# Patient Record
Sex: MALE | Race: WHITE | Employment: FULL TIME | URBAN - METROPOLITAN AREA
[De-identification: names, ages, dates, MRNs, and addresses within clinical notes are randomized per-mention and may not be internally consistent; named-entity substitution may affect disease eponyms.]

---

## 2017-04-11 ENCOUNTER — TRANSFERRED RECORDS (OUTPATIENT)
Dept: HEALTH INFORMATION MANAGEMENT | Facility: CLINIC | Age: 62
End: 2017-04-11

## 2017-04-11 ENCOUNTER — APPOINTMENT (OUTPATIENT)
Dept: CT IMAGING | Facility: CLINIC | Age: 62
DRG: 446 | End: 2017-04-11
Attending: EMERGENCY MEDICINE
Payer: COMMERCIAL

## 2017-04-11 ENCOUNTER — HOSPITAL ENCOUNTER (INPATIENT)
Facility: CLINIC | Age: 62
LOS: 2 days | Discharge: HOME | DRG: 446 | End: 2017-04-13
Attending: EMERGENCY MEDICINE | Admitting: INTERNAL MEDICINE
Payer: COMMERCIAL

## 2017-04-11 ENCOUNTER — APPOINTMENT (OUTPATIENT)
Dept: ULTRASOUND IMAGING | Facility: CLINIC | Age: 62
DRG: 446 | End: 2017-04-11
Attending: EMERGENCY MEDICINE
Payer: COMMERCIAL

## 2017-04-11 DIAGNOSIS — R17 JAUNDICE: ICD-10-CM

## 2017-04-11 DIAGNOSIS — E80.6 DIRECT HYPERBILIRUBINEMIA: ICD-10-CM

## 2017-04-11 LAB
ALBUMIN SERPL-MCNC: 3.5 G/DL (ref 3.4–5)
ALBUMIN UR-MCNC: NEGATIVE MG/DL
ALP SERPL-CCNC: 103 U/L (ref 40–150)
ALT SERPL W P-5'-P-CCNC: 235 U/L (ref 0–70)
ANION GAP SERPL CALCULATED.3IONS-SCNC: 10 MMOL/L (ref 3–14)
APPEARANCE UR: CLEAR
AST SERPL W P-5'-P-CCNC: 119 U/L (ref 0–45)
BASOPHILS # BLD AUTO: 0 10E9/L (ref 0–0.2)
BASOPHILS NFR BLD AUTO: 0.4 %
BILIRUB DIRECT SERPL-MCNC: 12.3 MG/DL (ref 0–0.2)
BILIRUB SERPL-MCNC: 20.4 MG/DL (ref 0.2–1.3)
BILIRUB UR QL STRIP: ABNORMAL
BUN SERPL-MCNC: 12 MG/DL (ref 7–30)
CALCIUM SERPL-MCNC: 8.5 MG/DL (ref 8.5–10.1)
CHLORIDE SERPL-SCNC: 103 MMOL/L (ref 94–109)
CO2 SERPL-SCNC: 25 MMOL/L (ref 20–32)
COLOR UR AUTO: ABNORMAL
CREAT SERPL-MCNC: 0.86 MG/DL (ref 0.66–1.25)
DIFFERENTIAL METHOD BLD: ABNORMAL
EOSINOPHIL # BLD AUTO: 0.1 10E9/L (ref 0–0.7)
EOSINOPHIL NFR BLD AUTO: 1.6 %
ERYTHROCYTE [DISTWIDTH] IN BLOOD BY AUTOMATED COUNT: 13.3 % (ref 10–15)
ETHANOL SERPL-MCNC: <0.01 G/DL
GFR SERPL CREATININE-BSD FRML MDRD: ABNORMAL ML/MIN/1.7M2
GLUCOSE SERPL-MCNC: 77 MG/DL (ref 70–99)
GLUCOSE UR STRIP-MCNC: NEGATIVE MG/DL
HCT VFR BLD AUTO: 35.8 % (ref 40–53)
HGB BLD-MCNC: 12.4 G/DL (ref 13.3–17.7)
HGB UR QL STRIP: NEGATIVE
IMM GRANULOCYTES # BLD: 0 10E9/L (ref 0–0.4)
IMM GRANULOCYTES NFR BLD: 0.7 %
INR PPP: 1.15 (ref 0.86–1.14)
KETONES UR STRIP-MCNC: 20 MG/DL
LEUKOCYTE ESTERASE UR QL STRIP: NEGATIVE
LIPASE SERPL-CCNC: 88 U/L (ref 73–393)
LYMPHOCYTES # BLD AUTO: 0.9 10E9/L (ref 0.8–5.3)
LYMPHOCYTES NFR BLD AUTO: 20.3 %
MCH RBC QN AUTO: 32.9 PG (ref 26.5–33)
MCHC RBC AUTO-ENTMCNC: 34.6 G/DL (ref 31.5–36.5)
MCV RBC AUTO: 95 FL (ref 78–100)
MONOCYTES # BLD AUTO: 0.4 10E9/L (ref 0–1.3)
MONOCYTES NFR BLD AUTO: 8 %
MUCOUS THREADS #/AREA URNS LPF: PRESENT /LPF
NEUTROPHILS # BLD AUTO: 3.1 10E9/L (ref 1.6–8.3)
NEUTROPHILS NFR BLD AUTO: 69 %
NITRATE UR QL: NEGATIVE
NRBC # BLD AUTO: 0 10*3/UL
NRBC BLD AUTO-RTO: 0 /100
PH UR STRIP: 5 PH (ref 5–7)
PLATELET # BLD AUTO: 94 10E9/L (ref 150–450)
POTASSIUM SERPL-SCNC: 3.4 MMOL/L (ref 3.4–5.3)
PROT SERPL-MCNC: 6.8 G/DL (ref 6.8–8.8)
RBC # BLD AUTO: 3.77 10E12/L (ref 4.4–5.9)
RBC #/AREA URNS AUTO: <1 /HPF (ref 0–2)
SODIUM SERPL-SCNC: 138 MMOL/L (ref 133–144)
SP GR UR STRIP: 1.01 (ref 1–1.03)
TROPONIN I SERPL-MCNC: NORMAL UG/L (ref 0–0.04)
URN SPEC COLLECT METH UR: ABNORMAL
UROBILINOGEN UR STRIP-MCNC: 4 MG/DL (ref 0–2)
WBC # BLD AUTO: 4.5 10E9/L (ref 4–11)
WBC #/AREA URNS AUTO: 1 /HPF (ref 0–2)

## 2017-04-11 PROCEDURE — 80053 COMPREHEN METABOLIC PANEL: CPT | Performed by: EMERGENCY MEDICINE

## 2017-04-11 PROCEDURE — 84484 ASSAY OF TROPONIN QUANT: CPT | Performed by: EMERGENCY MEDICINE

## 2017-04-11 PROCEDURE — 86708 HEPATITIS A ANTIBODY: CPT | Performed by: EMERGENCY MEDICINE

## 2017-04-11 PROCEDURE — 76700 US EXAM ABDOM COMPLETE: CPT

## 2017-04-11 PROCEDURE — 25500064 ZZH RX 255 OP 636: Performed by: EMERGENCY MEDICINE

## 2017-04-11 PROCEDURE — 74177 CT ABD & PELVIS W/CONTRAST: CPT

## 2017-04-11 PROCEDURE — 99285 EMERGENCY DEPT VISIT HI MDM: CPT | Mod: 25

## 2017-04-11 PROCEDURE — 82248 BILIRUBIN DIRECT: CPT | Performed by: EMERGENCY MEDICINE

## 2017-04-11 PROCEDURE — 36415 COLL VENOUS BLD VENIPUNCTURE: CPT | Performed by: EMERGENCY MEDICINE

## 2017-04-11 PROCEDURE — 86803 HEPATITIS C AB TEST: CPT | Performed by: EMERGENCY MEDICINE

## 2017-04-11 PROCEDURE — 86706 HEP B SURFACE ANTIBODY: CPT | Performed by: EMERGENCY MEDICINE

## 2017-04-11 PROCEDURE — 85610 PROTHROMBIN TIME: CPT | Performed by: EMERGENCY MEDICINE

## 2017-04-11 PROCEDURE — 12000000 ZZH R&B MED SURG/OB

## 2017-04-11 PROCEDURE — 80320 DRUG SCREEN QUANTALCOHOLS: CPT | Performed by: EMERGENCY MEDICINE

## 2017-04-11 PROCEDURE — 81001 URINALYSIS AUTO W/SCOPE: CPT | Performed by: EMERGENCY MEDICINE

## 2017-04-11 PROCEDURE — 96360 HYDRATION IV INFUSION INIT: CPT

## 2017-04-11 PROCEDURE — 85025 COMPLETE CBC W/AUTO DIFF WBC: CPT | Performed by: EMERGENCY MEDICINE

## 2017-04-11 PROCEDURE — 87340 HEPATITIS B SURFACE AG IA: CPT | Performed by: EMERGENCY MEDICINE

## 2017-04-11 PROCEDURE — 99223 1ST HOSP IP/OBS HIGH 75: CPT | Mod: AI | Performed by: INTERNAL MEDICINE

## 2017-04-11 PROCEDURE — 25000128 H RX IP 250 OP 636: Performed by: EMERGENCY MEDICINE

## 2017-04-11 PROCEDURE — 83690 ASSAY OF LIPASE: CPT | Performed by: EMERGENCY MEDICINE

## 2017-04-11 PROCEDURE — 96361 HYDRATE IV INFUSION ADD-ON: CPT

## 2017-04-11 RX ORDER — ONDANSETRON 2 MG/ML
4 INJECTION INTRAMUSCULAR; INTRAVENOUS EVERY 6 HOURS PRN
Status: DISCONTINUED | OUTPATIENT
Start: 2017-04-11 | End: 2017-04-13 | Stop reason: HOSPADM

## 2017-04-11 RX ORDER — ONDANSETRON 4 MG/1
4 TABLET, ORALLY DISINTEGRATING ORAL EVERY 6 HOURS PRN
Status: DISCONTINUED | OUTPATIENT
Start: 2017-04-11 | End: 2017-04-13 | Stop reason: HOSPADM

## 2017-04-11 RX ORDER — DIPHENHYDRAMINE HCL 25 MG
25 CAPSULE ORAL EVERY 6 HOURS PRN
COMMUNITY

## 2017-04-11 RX ORDER — HYDROMORPHONE HYDROCHLORIDE 1 MG/ML
.3-.5 INJECTION, SOLUTION INTRAMUSCULAR; INTRAVENOUS; SUBCUTANEOUS
Status: DISCONTINUED | OUTPATIENT
Start: 2017-04-11 | End: 2017-04-13 | Stop reason: HOSPADM

## 2017-04-11 RX ORDER — NALOXONE HYDROCHLORIDE 0.4 MG/ML
.1-.4 INJECTION, SOLUTION INTRAMUSCULAR; INTRAVENOUS; SUBCUTANEOUS
Status: DISCONTINUED | OUTPATIENT
Start: 2017-04-11 | End: 2017-04-13 | Stop reason: HOSPADM

## 2017-04-11 RX ORDER — IOPAMIDOL 755 MG/ML
500 INJECTION, SOLUTION INTRAVASCULAR ONCE
Status: COMPLETED | OUTPATIENT
Start: 2017-04-11 | End: 2017-04-11

## 2017-04-11 RX ADMIN — SODIUM CHLORIDE 1000 ML: 9 INJECTION, SOLUTION INTRAVENOUS at 20:13

## 2017-04-11 RX ADMIN — SODIUM CHLORIDE 65 ML: 9 INJECTION, SOLUTION INTRAVENOUS at 23:21

## 2017-04-11 RX ADMIN — IOPAMIDOL 100 ML: 755 INJECTION, SOLUTION INTRAVENOUS at 23:21

## 2017-04-11 ASSESSMENT — ACTIVITIES OF DAILY LIVING (ADL)
AMBULATION: 0-->INDEPENDENT
COGNITION: 0 - NO COGNITION ISSUES REPORTED
RETIRED_EATING: 0-->INDEPENDENT
SWALLOWING: 0-->SWALLOWS FOODS/LIQUIDS WITHOUT DIFFICULTY
TOILETING: 0-->INDEPENDENT
RETIRED_COMMUNICATION: 0-->UNDERSTANDS/COMMUNICATES WITHOUT DIFFICULTY
SWALLOWING: 0-->SWALLOWS FOODS/LIQUIDS WITHOUT DIFFICULTY
DRESS: 0-->INDEPENDENT
TOILETING: 0-->INDEPENDENT
COMMUNICATION: 0-->UNDERSTANDS/COMMUNICATES WITHOUT DIFFICULTY
TRANSFERRING: 0-->INDEPENDENT
AMBULATION: 0-->INDEPENDENT
BATHING: 0-->INDEPENDENT
BATHING: 0-->INDEPENDENT
TRANSFERRING: 0-->INDEPENDENT
FALL_HISTORY_WITHIN_LAST_SIX_MONTHS: NO
DRESS: 0-->INDEPENDENT
EATING: 0-->INDEPENDENT

## 2017-04-11 ASSESSMENT — ENCOUNTER SYMPTOMS
DYSURIA: 0
COLOR CHANGE: 1
ABDOMINAL PAIN: 1

## 2017-04-11 NOTE — IP AVS SNAPSHOT
MRN:[id number]                      After Visit Summary   4/11/2017    Phillip Crabtree    MRN: [id number]           Thank you!     Thank you for choosing [facility] for your care. Our goal is always to provide you with excellent care. Hearing back from our patients is one way we can continue to improve our services. Please take a few minutes to complete the written survey that you may receive in the mail after you visit. If you would like to speak to someone directly about your visit please contact Patient Relations at [phone]. Thank you!          Patient Information     Date Of Birth          1955        Designated Caregiver       Most Recent Value    Caregiver    Will someone help with your care after discharge? yes    Name of designated caregiver Xi     Phone number of caregiver [phone]    Caregiver address [address]      About your hospital stay     You were admitted on:  April 11, 2017 You last received care in the:  [place] [facility]    You were discharged on:  April 13, 2017        Reason for your hospital stay       jaundice                  Who to Call     For medical emergencies, please call 911.  For non-urgent questions about your medical care, please call your primary care provider or clinic, [phone]          Attending Provider     Provider Specialty    Apryl Rivera MD Emergency Medicine    Chelsea Crowe MD Internal Medicine       Primary Care Provider Office Phone # Fax #    [facility] [phone] [address] [facility] [address]        After Care Instructions     Activity       Your activity upon discharge: activity as tolerated            Diet       Follow this diet upon discharge: Orders Placed This Encounter      Low Fat Diet                  Follow-up Appointments     Follow-up and recommended labs and tests        Follow up with  "primary care provider, [facility], within 7 days   Follow up with liver clinic as scheduled                  Pending Results     No orders found from 2017 to 2017.            Statement of Approval     Ordered          17 [id number]  I have reviewed and agree with all the recommendations and orders detailed in this document.  EFFECTIVE NOW     Approved and electronically signed by:  Luis Valerio MD             Admission Information     Date & Time Provider Department Dept. Phone    2017 Chelsea Crowe MD [place] [address] [facility] [phone]      Your Vitals Were     Blood Pressure Pulse Temperature Respirations Height Weight    138/78 (BP Location: Left arm) 62 97.6  F (36.4  C) (Oral) 18 1.778 m (5' 10\") 106.8 kg (235 lb 8 oz)    Pulse Oximetry BMI (Body Mass Index)                99% 33.79 kg/m2          MyChart Information     [facility] lets you send messages to your doctor, view your test results, renew your prescriptions, schedule appointments and more. To sign up, go to [email or web address] . Click on \"Log in\" on the left side of the screen, which will take you to the Welcome page. Then click on \"Sign up Now\" on the right side of the page.     You will be asked to enter the access code listed below, as well as some personal information. Please follow the directions to create your username and password.     Your access code is: [email or web address]  Expires: 2017  4:58 PM     Your access code will  in 90 days. If you need help or a new code, please call your [facility] clinic or [phone].        Care EveryWhere ID     This is your Care EveryWhere ID. This could be used by other organizations to access your [facility] medical records  [id number]           Review of your medicines      CONTINUE these medicines which have NOT CHANGED        Dose / Directions    BENADRYL 25 MG capsule   Generic drug:  diphenhydrAMINE        Dose:  25 mg   Take 25 " mg by mouth every 6 hours as needed for itching or allergies   Refills:  0       OMEPRAZOLE PO        Dose:  20 mg   Take 20 mg by mouth every morning   Refills:  0       VITAMIN D3 PO        Dose:  2000 Units   Take 2,000 Units by mouth daily   Refills:  0                Protect others around you: Learn how to safely use, store and throw away your medicines at www.disposemymeds.org.             Medication List: This is a list of all your medications and when to take them. Check marks below indicate your daily home schedule. Keep this list as a reference.      Medications           Morning Afternoon Evening Bedtime As Needed    BENADRYL 25 MG capsule   Take 25 mg by mouth every 6 hours as needed for itching or allergies   Generic drug:  diphenhydrAMINE                                OMEPRAZOLE PO   Take 20 mg by mouth every morning                                VITAMIN D3 PO   Take 2,000 Units by mouth daily

## 2017-04-11 NOTE — IP AVS SNAPSHOT
[facility] [facility] [address] [facility]    [address]    [facility] [address]    Phone:  [phone]    Fax:  [phone]                                       After Visit Summary   4/11/2017    Phillip Crabtree    MRN: [id number]           After Visit Summary Signature Page     I have received my discharge instructions, and my questions have been answered. I have discussed any challenges I see with this plan with the nurse or doctor.    ..........................................................................................................................................  Patient/Patient Representative Signature      ..........................................................................................................................................  Patient Representative Print Name and Relationship to Patient    ..................................................               ................................................  Date                                            Time    ..........................................................................................................................................  Reviewed by Signature/Title    ...................................................              ..............................................  Date                                                            Time

## 2017-04-12 LAB
ALBUMIN SERPL-MCNC: 3.1 G/DL (ref 3.4–5)
ALP SERPL-CCNC: 88 U/L (ref 40–150)
ALT SERPL W P-5'-P-CCNC: 187 U/L (ref 0–70)
ANION GAP SERPL CALCULATED.3IONS-SCNC: 8 MMOL/L (ref 3–14)
AST SERPL W P-5'-P-CCNC: 85 U/L (ref 0–45)
BILIRUB SERPL-MCNC: 16.2 MG/DL (ref 0.2–1.3)
BUN SERPL-MCNC: 10 MG/DL (ref 7–30)
CALCIUM SERPL-MCNC: 8.2 MG/DL (ref 8.5–10.1)
CHLORIDE SERPL-SCNC: 108 MMOL/L (ref 94–109)
CO2 SERPL-SCNC: 24 MMOL/L (ref 20–32)
CREAT SERPL-MCNC: 0.82 MG/DL (ref 0.66–1.25)
ERYTHROCYTE [DISTWIDTH] IN BLOOD BY AUTOMATED COUNT: 13.4 % (ref 10–15)
GFR SERPL CREATININE-BSD FRML MDRD: ABNORMAL ML/MIN/1.7M2
GLUCOSE SERPL-MCNC: 98 MG/DL (ref 70–99)
HAV IGG SER QL IA: NORMAL
HAV IGM SERPL QL IA: NORMAL
HBV SURFACE AB SERPL IA-ACNC: 182.35 M[IU]/ML
HBV SURFACE AG SERPL QL IA: NONREACTIVE
HCT VFR BLD AUTO: 32.7 % (ref 40–53)
HCV AB SERPL QL IA: NORMAL
HETEROPH AB SER QL: NEGATIVE
HGB BLD-MCNC: 11.5 G/DL (ref 13.3–17.7)
IRON SATN MFR SERPL: 20 % (ref 15–46)
IRON SERPL-MCNC: 51 UG/DL (ref 35–180)
MCH RBC QN AUTO: 33.3 PG (ref 26.5–33)
MCHC RBC AUTO-ENTMCNC: 35.2 G/DL (ref 31.5–36.5)
MCV RBC AUTO: 95 FL (ref 78–100)
PLATELET # BLD AUTO: 93 10E9/L (ref 150–450)
POTASSIUM SERPL-SCNC: 3.5 MMOL/L (ref 3.4–5.3)
PROT SERPL-MCNC: 6 G/DL (ref 6.8–8.8)
RBC # BLD AUTO: 3.45 10E12/L (ref 4.4–5.9)
SODIUM SERPL-SCNC: 140 MMOL/L (ref 133–144)
TIBC SERPL-MCNC: 259 UG/DL (ref 240–430)
WBC # BLD AUTO: 3.3 10E9/L (ref 4–11)

## 2017-04-12 PROCEDURE — 12000000 ZZH R&B MED SURG/OB

## 2017-04-12 PROCEDURE — 86038 ANTINUCLEAR ANTIBODIES: CPT | Performed by: PHYSICIAN ASSISTANT

## 2017-04-12 PROCEDURE — 25000125 ZZHC RX 250: Performed by: INTERNAL MEDICINE

## 2017-04-12 PROCEDURE — 83550 IRON BINDING TEST: CPT | Performed by: PHYSICIAN ASSISTANT

## 2017-04-12 PROCEDURE — 99232 SBSQ HOSP IP/OBS MODERATE 35: CPT | Performed by: INTERNAL MEDICINE

## 2017-04-12 PROCEDURE — 86308 HETEROPHILE ANTIBODY SCREEN: CPT | Performed by: PHYSICIAN ASSISTANT

## 2017-04-12 PROCEDURE — 83516 IMMUNOASSAY NONANTIBODY: CPT | Performed by: PHYSICIAN ASSISTANT

## 2017-04-12 PROCEDURE — 85027 COMPLETE CBC AUTOMATED: CPT | Performed by: INTERNAL MEDICINE

## 2017-04-12 PROCEDURE — 36415 COLL VENOUS BLD VENIPUNCTURE: CPT | Performed by: INTERNAL MEDICINE

## 2017-04-12 PROCEDURE — 86709 HEPATITIS A IGM ANTIBODY: CPT | Performed by: PHYSICIAN ASSISTANT

## 2017-04-12 PROCEDURE — 80053 COMPREHEN METABOLIC PANEL: CPT | Performed by: INTERNAL MEDICINE

## 2017-04-12 PROCEDURE — 36415 COLL VENOUS BLD VENIPUNCTURE: CPT | Performed by: PHYSICIAN ASSISTANT

## 2017-04-12 PROCEDURE — 86645 CMV ANTIBODY IGM: CPT | Performed by: PHYSICIAN ASSISTANT

## 2017-04-12 PROCEDURE — 83540 ASSAY OF IRON: CPT | Performed by: PHYSICIAN ASSISTANT

## 2017-04-12 RX ORDER — DIPHENHYDRAMINE HCL 25 MG
25 CAPSULE ORAL EVERY 6 HOURS PRN
Status: DISCONTINUED | OUTPATIENT
Start: 2017-04-12 | End: 2017-04-13 | Stop reason: HOSPADM

## 2017-04-12 RX ADMIN — DEXTROSE AND SODIUM CHLORIDE: 5; 900 INJECTION, SOLUTION INTRAVENOUS at 06:43

## 2017-04-12 RX ADMIN — DEXTROSE AND SODIUM CHLORIDE: 5; 900 INJECTION, SOLUTION INTRAVENOUS at 14:44

## 2017-04-12 RX ADMIN — DEXTROSE AND SODIUM CHLORIDE: 5; 900 INJECTION, SOLUTION INTRAVENOUS at 00:02

## 2017-04-12 RX ADMIN — PANTOPRAZOLE SODIUM 40 MG: 40 INJECTION, POWDER, FOR SOLUTION INTRAVENOUS at 06:43

## 2017-04-12 RX ADMIN — DEXTROSE AND SODIUM CHLORIDE: 5; 900 INJECTION, SOLUTION INTRAVENOUS at 23:19

## 2017-04-12 NOTE — PHARMACY-ADMISSION MEDICATION HISTORY
Admission medication history interview status for this patient is complete. See [facility] admission navigator for allergy information, prior to admission medications and immunization status.     Medication history interview source(s):Patient  Medication history resources (including written lists, pill bottles, clinic record):None  Primary pharmacy:-    Changes made to PTA medication list:  Added: benadryl  Deleted: -  Changed: -    Actions taken by pharmacist (provider contacted, etc):None     Additional medication history information:None    Medication reconciliation/reorder completed by provider prior to medication history? No    For patients on insulin therapy: No  Lantus/levemir/NPH/Mix 70/30 dose:  _____   in AM/PM  or twice daily   Sliding scale Novolog Y/N  If Yes, do you have a baseline novolog pre-meal dose:  ______units with meals   Patients eat three meals a day:   Y/N     Any Barriers to therapy:  cost of medications/comfortable with giving injections (if applicable)/ comfortable and confident with current diabetes regimen       Prior to Admission medications    Medication Sig Last Dose Taking? Auth Provider   diphenhydrAMINE (BENADRYL) 25 MG capsule Take 25 mg by mouth every 6 hours as needed for itching or allergies  Yes Unknown, Entered By History   OMEPRAZOLE PO Take 20 mg by mouth every morning 4/11/2017 at Unknown time Yes Unknown, Entered By History   Cholecalciferol (VITAMIN D3 PO) Take 2,000 Units by mouth daily 4/10/2017 at am Yes Unknown, Entered By History

## 2017-04-12 NOTE — H&P
[facility]  Hospitalist Admission Note  Name: Phillip Crabtree    MRN: [id number]  YOB: 1955    Age: 61 year old  Date of admission: 4/11/2017  Primary care provider: [facility] [address]    Chief Complaint:  Jaundice    Assessment and Plan:     Phillip Crabtree is a 61 year old male with PMH including GERD who was admitted 04/11/17 with 2 day history of epigastric pain with eating followed by development of significant jaundice and was found to have hyperbilirubinemia and transaminitis.    1. Hyperbilirubinemia and Transaminitis: Patient developed jaundice today and was found to have transaminitis with significantly elevated bilirubin (total 20.4 and direct 12.3).  He has had epigastric pain for the past 48 hours which is worse when eating.  I suspect this is biliary in origin.  Could also be related to some sort of infection.  Hepatitis serologies are pending.  Abdominal US showed cholelithiasis and enlarged spleen.  CT abdomen currently pending.  - NPO  - IVF  - Hepatitis serologies  - CT abdomen  - Consult GI    2. Thrombocytopenia: Platelets 94.  No bleeding.  Was low at 119 in 2014.  This is likely due to acute pathology causing the hyperbilirubinemia.  Repeat in AM.    3. Epigastric Abdominal Pain: Developed abdominal pain when eating on Sunday.  As above has significant liver elevations.  Lipase WNL.  CT pending at this time.  Will keep the patient NPO in case he will need a scope.  - Pain control with IV dilaudid  - NPO with IVF    4. GERD: IV PPI while NPO.    DVT Prophylaxis: Pneumatic Compression Devices  Code Status: Full Code  Discharge Dispo: Admit to inpatient status  Estimated Disch Date / # of Days until Disch: At least 2 midnights      History of Present Illness:  Phillip Crabtree is a 61 year old male with PMH including GERD who was admitted 04/11/17 with 2 day history of epigastric pain with eating followed by development of significant jaundice and was found  to have hyperbilirubinemia and transaminitis.  History was obtained through patient interview, chart review and discussion with Dr. Rivera in the ER.    The patient states that he had a taco party at his home on Sunday.  He ate 2 tacos and had 2 beers and that night developed a pain in the epigastric region which radiated under his rib cage.  He has had this before and was diagnosed with gastritis so he thought it would get better.  The pain resolved by the middle of the night and when he got up yesterday morning it had resolved.  He ate breakfast and the pain returned after eating.  He did not eat the rest of the day but the pain persisted.  Eventually it did improve to the point that this evening he was feeling hungry so tried to eat again.  He ate chicken soup with crackers and again had severe pain.  Last night when he urinated he noticed that it looked florescent orange.  This morning his wife noticed that he was jaundiced which prompted evaluation.    He has not had diarrhea or constipation.  Last BM was 2 days ago.  He did feel like he had a fever and chills yesterday but did not check his temperature.  He denies CP, cough, SOB, nausea, emesis.  He does have a slight HA and feels dehydrated.  He has been able to drink water without causing significant abdominal pain.  He has never had anything like this before.  He has not had sick contacts and no one else at home is currently sick.  He has not had any recent travel.      He reports being told that he had HAV in the past but this was because of a blood test.  He never actually recalls being sick.  He has no known liver problems.       Past Medical History:  Past Medical History:   Diagnosis Date     Gastroesophageal reflux disease      Past Surgical History:  History reviewed. No pertinent surgical history.  Social History:  Social History   Substance Use Topics     Smoking status: Not on file     Smokeless tobacco: Not on file     Alcohol use 3.6 oz/week      3 Shots of liquor, 3 Cans of beer per week      Comment: Drinks beer/wine/liquor 3x/week     Social History     Social History Narrative   The patient has never smoked tobacco.  He drinks on the weekends, 2-3 drinks per day.  He is an .    Family History:  No history of liver disease    Allergies:  Allergies   Allergen Reactions     Ibuprofen      Medications:  Current Facility-Administered Medications   Medication     iopamidol (ISOVUE-370) solution 500 mL     0.9% sodium chloride BOLUS     Current Outpatient Prescriptions   Medication     diphenhydrAMINE (BENADRYL) 25 MG capsule     OMEPRAZOLE PO     Cholecalciferol (VITAMIN D3 PO)      Review of Systems:  A Comprehensive greater than 10 system review of systems was carried out.  Pertinent positives and negatives are noted above.  Otherwise negative for contributory information.     Physical Exam:  Blood pressure 128/74, pulse 74, temperature 98.5  F (36.9  C), temperature source Oral, resp. rate 18, weight 106.6 kg (235 lb), SpO2 99 %.  Wt Readings from Last 1 Encounters:   04/11/17 106.6 kg (235 lb)     Exam:   General: Alert, awake, no acute distress, jaundiced.  HEENT: NC/AT, eyes icteric but without injection, EOMI, face symmetric.  Dentition WNL, MMM.  Cardiac: RRR, normal S1, S2.  No murmurs/g/r.  No LE edema  Pulmonary: Normal chest rise, normal work of breathing.  Lungs CTAB without crackles or wheezing  Abdomen: soft, non-tender, non-distended.  Normoactive BS.  No guarding or rebound tenderness.  Extremities: no deformities.  Warm, well perfused.  Skin: no rashes, jaundice diffusely.  Warm and Dry.  Neuro: No focal deficits noted.  Speech clear.  Coordination and strength grossly normal.  Psych: Appropriate affect. Alert and oriented x3    Data Reviewed Today:  Imaging:  Results for orders placed or performed during the hospital encounter of 04/11/17   Abdomen US, complete    Narrative    ULTRASOUND ABDOMEN COMPLETE 4/11/2017 9:16 PM      HISTORY: Abdominal distention.    COMPARISON: None.    FINDINGS:  Liver is unremarkable in echogenicity without focal solid  lesions. Gallbladder demonstrates cholelithiasis without  cholecystitis. Extrahepatic bile duct is normal in diameter. Pancreas  is completely obscured. Spleen is unremarkable in echogenicity but  enlarged at 16.1 cm. Kidneys are normal in size. There is no  hydronephrosis. Visualized abdominal aorta and IVC are nonaneurysmal.  No ascites demonstrated.      Impression    IMPRESSION:    1. No ascites.  2. Enlarged spleen at 16.1 cm.    SHAYY DUFFY MD     Labs:    Recent Labs  Lab 04/11/17 2040   WBC 4.5   HGB 12.4*   HCT 35.8*   MCV 95   PLT 94*       Recent Labs  Lab 04/11/17 2040      POTASSIUM 3.4   CHLORIDE 103   CO2 25   ANIONGAP 10   GLC 77   BUN 12   CR 0.86   GFRESTIMATED >90Non  GFR Calc   GFRESTBLACK >90African American GFR Calc   LISA 8.5   PROTTOTAL 6.8   ALBUMIN 3.5   BILITOTAL 20.4*   ALKPHOS 103   *   *       Recent Labs  Lab 04/11/17 2040   INR 1.15*       Recent Labs  Lab 04/11/17 2040   LIPASE 88       Recent Labs  Lab 04/11/17 2040   TROPI <0.015The 99th percentile for upper reference range is 0.045 ug/L.  Troponin values in the range of 0.045 - 0.120 ug/L may be associated with risks of adverse clinical events.       Chelesa Crowe MD  Hospitalist  [facility]

## 2017-04-12 NOTE — PROVIDER NOTIFICATION
Requesting benadryl for patient since he is reporting some itching since having received contrast for the CT.

## 2017-04-12 NOTE — PLAN OF CARE
Problem: Goal Outcome Summary  Goal: Goal Outcome Summary  Outcome: Improving  Seen by PA for GI today and no planned procedure so was started on clear liquids and has been tolerating so far with no c/o pain or nausea, up independently in room, skin still jaundiced and urine dark orange, bili 16.1 which is down from yesterday with plt 93, last BM Sunday

## 2017-04-12 NOTE — PLAN OF CARE
Problem: Goal Outcome Summary  Goal: Goal Outcome Summary  Vitals stable, denies abdominal pain but reports having a headache. NPO for GI consult. IVF infusing, voiding adequate amounts of orange color urine.

## 2017-04-12 NOTE — ED NOTES
Pt with abdominal pain for the past 2 days, noticed jaundice today. Drinks 3x/week. Labs done at clinic, sent here for US. ABC's intact, alert and oriented X3.

## 2017-04-12 NOTE — ED PROVIDER NOTES
"  History     Chief Complaint:  Abdominal Pain and Jaundice      HPI   Phillip Crabtree is a 61 year old male with a past medical history of GERD who presents from clinic for evaluation of abdominal pain and new onset of jaundice. The patient reports he has been having epigastric abdominal pain over the past two days which is present when he eats. He states that he did have tacos two nights ago ( at his home on Sunday). He ate 2 tacos and had 2 beers and that night developed a pain in the epigastric region which radiated under his rib cage. He has had this before and was diagnosed with gastritis so he thought it wound improve over time. The pain resolved by the middle of the night and when he got up Monday morning it had resolved. He ate breakfast and the pain returned after eating. Tuesday the patient's pain resolved, and he thought he would try eating again.  He ate chicken soup with crackers and again experienced severe pain- located in epigastric region but radiates into LUQ and RUQ as well.. Last night when he urinated he noticed that it looked dark.    Patient went to clinic tonight for concern of jaundice and further evaluation; he was promptly sent to the ED. He denies any abdominal surgeries, family history of liver problems and he reports that he only has three drinks a week. He notes that his urine has been dark, but denies any hematuria or any other urinary symptoms.   No chest pain or cough.  Patient denies diarrhea or hematochezia.  No cough.   No fevers or international travel.  No diarrhea.  Remote history of \"mild\" hepatitis A.     Patient had bloodwork and an abdominal xray (before arrival today) as shown below:      XR Abd Flat And Upright4/11/2017  [facility]  Result Narrative   COMPARISON:  05/04/2013    FINDINGS:  Stool and bowel gas pattern is nonspecific. Question splenomegaly.   Status Results Details     Encounter Summary         Component Name  4/11/2017 1/29/2014 4/6/2011 2/13/2009   "   250 (H)   30 23   127 (H) 22       20.1 (H) 1.3 (H)       107         Alanine Aminotransferase   Aspartate Aminotransferase   Bilirubin Total   Alk Phos         Allergies:  Ibuprofen      Medications:    Omeprazole  Vitamin D     Past Medical History:    GERD  History of basal cell carcinoma 01/12/2017   Overview:     BCC, chest, s/p excision 2011.     LEW (obstructive sleep apnea) 01/10/2015   Overview:     Setting: APAP 5/15  Supplied by: [facility]  PSG done: 12/28/14  AHI 11  RDI 11  Lowest O2 Sat: 88%  New 1/10/15       Obesity 04/06/2011   Esophageal reflux 02/13/2009   Idiopathic urticaria 02/13/2009   Benign neoplasm of colon 02/13/2009   Mild hepatitis A- resolved    Past Surgical History:    History reviewed. No pertinent surgical history.    Family History:    History reviewed.  No significant family history.     Social History:  Marital Status:     Smoking status: unknown  Alcohol use: 3 times/week      Review of Systems   Gastrointestinal: Positive for abdominal pain.   Genitourinary: Negative for dysuria.   Skin: Positive for color change.   All other systems reviewed and are negative.    Pt with abdominal pain for the past 2 days, noticed jaundice today. Drinks 3x/week. Labs done at clinic, sent here for US.  No tylenol overdose or FH of liver problems.  Currently no abdominal pain.    Physical Exam   First Vitals:  BP: (!) 149/96  Pulse: 74  Heart Rate: 74  Temp: 98.5  F (36.9  C)  Resp: 18  Weight: 106.6 kg (235 lb)  SpO2: 99 %  (normal)    Physical Exam   Abdominal:         GEN: patient appears tired  HEAD: atraumatic, normocephalic  EYES: pupils reactive,  conjunctivae icteric  ENT: TMs flat and white bilaterally, oropharynx normal with no erythema or exudate, mucus membranes dry  NECK: no cervical LAD  RESPIRATORY: no tachypnea, breath sounds clear to auscultation (no rales, wheezes, rhonchi)  CVS: normal S1/S2, no murmurs/rubs/gallops  ABDOMEN: soft, nontender, no masses or organomegaly, no  rebound, decreased bowel sounds, no peritoneal signs of ascites  BACK: no costovertebral angle tenderness  EXTREMITIES: intact pulses x 2,  no edema  MUSCULOSKELETAL: no deformities  SKIN: jaundiced  NEURO: GCS 15, cranial nerves intact.  Motor- moves all 4 extremities.  Sensation- intact.  Coordination- romberg negative, normal tandem gait, no ataxia.  Overall symmetrical exam  HEME: no bruising or petechiae/contusions  LYMPH: no lymphadenopathy    Emergency Department Course     Imaging:  Radiographic findings were communicated with the patient who voiced understanding of the findings.      Preliminary result by Interface, Radiant Ib (04/11/17 21:21:33)     Impression:     IMPRESSION:    1. No ascites.  2. Enlarged spleen at 16.1 cm.     Narrative:     ULTRASOUND ABDOMEN COMPLETE 4/11/2017 9:16 PM     HISTORY: Abdominal distention.    COMPARISON: None.    FINDINGS:  Liver is unremarkable in echogenicity without focal solid  lesions. Gallbladder demonstrates cholelithiasis without  cholecystitis. Extrahepatic bile duct is normal in diameter. Pancreas  is completely obscured. Spleen is unremarkable in echogenicity but  enlarged at 16.1 cm. Kidneys are normal in size. There is no  hydronephrosis. Visualized abdominal aorta and IVC are nonaneurysmal.  No ascites demonstrated.     CT abdomen: pending    Laboratory:  CBC: WBC 4.5 (WNL) HGB 12.4 (L) PLT 94 (L)   CMP: Cr 0.86 (WNL) Glucose 77 (WNL) Bilirubin total 20.4 (HH)  (H)  (H) Rest WNL  Lipase: 88 (WNL)  UA: Clear, jeannine urine; Urinebilin Moderate (A) Urineketon 20 (A) Urobilinogen 4.0 (H) Mucous present (A) Rest WNL  Urine Culture: Pending  2040: Troponin I: <0.015 (WNL)  Hepatitis A antibody IgC: Pending  Hepatitis B surface B surface antigen hamlet immune s: Pending  Hepatitis B surface antigen: Pending  Hepatitis C antibody: Pending  Bilirubin direct: 12.3 (H)  Alcohol ethyl: <0.01 (WNL)      Interventions:  2013: Normal saline, 1000 ml,  IV  Heplock  Cardiac/Sp02 monitoring  Oxygen by nasal cannula at 2L/min    ED Course:  Nursing notes and past medical history reviewed.   I performed a physical examination of the patient as documented above.  I explained the plan with the patient who consents to this.   The above workups were undertaken.    I personally reviewed the laboratory and imaging results with the Patient and answered all related questions prior to admission.  Findings and plan explained to the Patient who consents to admission. Discussed the patient with the hospitalist bed for further monitoring, evaluation, and treatment.    9:51 PM Patient updated      Impression & Plan      Unconjugated hyperbilirubinemia may be caused by: Hemolysis, Extravasation of blood into tissue, Dyserythropoiesis, Stress situations (eg, sepsis) leading to increased production of bilirubin, Impaired hepatic bilirubin uptake, Impaired bilirubin conjugation    Conjugated hyperbilirubinemia may be caused by: Biliary obstruction (eg, gallstones, pancreatic or biliary malignancy, AIDS cholangiopathy, parasites), Viral hepatitis, Alcoholic hepatitis, Nonalcoholic steatohepatitis, Primary biliary cholangitis, Drugs and toxins, Ischemic hepatopathy, Liver infiltration, Inherited disorders (eg, Dubin-Jack syndrome, Rotor syndrome, progressive familial intrahepatic cholestasis), TPN Postoperative jaundice, Intrahepatic cholestasis of pregnancy,  End-stage liver disease, Organ transplantation (eg, bone marrow, liver)      Medical Decision Making:  Phillip Crabtree is a 61 year old male who describes diffuse crampy abdominal pain (epigastric and RUQ/LUQ) with eating in addition to jaundice. He admits that he has a couple drinks per week. He went to his clinic which sent him here for evaluation. He was concerned that he might have a pancreatic mass. In clinic, he had a bilirubin that was over 20. On examination here, he was very jaundiced. His urine analysis shows bilirubin,  but his CBC is normal, hemoglobin is 12.4. The patient is currently going to ultrasound to look at the liver, more specifically at the gallbladder. Ultrasound shows the gallbladder is abnormal with gallstones and there is stones sludge, the liver appears normal, the pancreas is totally obscured and there is a possible splenomegaly and no ascites is seen. The patient s CMP came back showing his total bilirubin is 20.4, his AST and ALT are elevated.  His INR elevated showing that there is abnormality with regard to metabolic function of the liver. Lipase is negative showing no pancreatitis, troponin does not show any signs of ischemia and bilirubin direct versus indirect is pending as is the hepatitis. The patient is very jaundiced, and his alcohol level is negative and the plan is to admit him for further workup.     His conjugated bilirubin has come as 12.4, indicating that this is mostly a conjugated hyperbilirubinemia. Viral studies are pending, but there is no evidence of obstruction of hemolysis. The patient is feeling achy in his upper abdomen, but other than that, he does not have any peritoneal signs and ultrasound shows there are gallstones, but no evidence of obstruction. Case is discussed with the hospitalist and we are going to do a CT scan to look for any masses or common bile duct stones that are not seen on ultraound.     He will be admitted.   Currently no peritoneal signs.  Pain does get worse with eating ?occult biliary process.  Viral hepatitis studies are pending.    Diagnosis  Jaundice  Hyperbilirubinemia      Disposition:   Admit to a medical bed      Trino ESTEVES, am serving as a scribe on 4/11/2017 at 7:53 PM to personally document services performed by Apryl Rivera MD, based on my observations and the provider's statements to me     Apryl Rivera MD  04/12/17 0712

## 2017-04-13 VITALS
WEIGHT: 235.5 LBS | BODY MASS INDEX: 33.71 KG/M2 | SYSTOLIC BLOOD PRESSURE: 138 MMHG | OXYGEN SATURATION: 99 % | RESPIRATION RATE: 18 BRPM | DIASTOLIC BLOOD PRESSURE: 78 MMHG | HEIGHT: 70 IN | HEART RATE: 62 BPM | TEMPERATURE: 97.6 F

## 2017-04-13 LAB
ALBUMIN SERPL-MCNC: 3.2 G/DL (ref 3.4–5)
ALP SERPL-CCNC: 95 U/L (ref 40–150)
ALT SERPL W P-5'-P-CCNC: 168 U/L (ref 0–70)
ANA SER QL IA: NORMAL
ANION GAP SERPL CALCULATED.3IONS-SCNC: 5 MMOL/L (ref 3–14)
AST SERPL W P-5'-P-CCNC: 70 U/L (ref 0–45)
BASOPHILS # BLD AUTO: 0 10E9/L (ref 0–0.2)
BASOPHILS NFR BLD AUTO: 0.3 %
BILIRUB DIRECT SERPL-MCNC: 6.7 MG/DL (ref 0–0.2)
BILIRUB SERPL-MCNC: 9.2 MG/DL (ref 0.2–1.3)
BUN SERPL-MCNC: 5 MG/DL (ref 7–30)
CALCIUM SERPL-MCNC: 8.2 MG/DL (ref 8.5–10.1)
CHLORIDE SERPL-SCNC: 112 MMOL/L (ref 94–109)
CMV IGM SERPL QL IA: NORMAL AI (ref 0–0.8)
CO2 SERPL-SCNC: 25 MMOL/L (ref 20–32)
COPATH REPORT: NORMAL
CREAT SERPL-MCNC: 0.8 MG/DL (ref 0.66–1.25)
DIFFERENTIAL METHOD BLD: NORMAL
EOSINOPHIL # BLD AUTO: 0.1 10E9/L (ref 0–0.7)
EOSINOPHIL NFR BLD AUTO: 2.7 %
ERYTHROCYTE [DISTWIDTH] IN BLOOD BY AUTOMATED COUNT: 13.5 % (ref 10–15)
GFR SERPL CREATININE-BSD FRML MDRD: ABNORMAL ML/MIN/1.7M2
GLUCOSE SERPL-MCNC: 106 MG/DL (ref 70–99)
HCT VFR BLD AUTO: 34.9 % (ref 40–53)
HGB BLD-MCNC: 11.9 G/DL (ref 13.3–17.7)
IMM GRANULOCYTES # BLD: 0 10E9/L (ref 0–0.4)
IMM GRANULOCYTES NFR BLD: 0.6 %
LYMPHOCYTES # BLD AUTO: 0.9 10E9/L (ref 0.8–5.3)
LYMPHOCYTES NFR BLD AUTO: 27.1 %
MCH RBC QN AUTO: 32.6 PG (ref 26.5–33)
MCHC RBC AUTO-ENTMCNC: 34.1 G/DL (ref 31.5–36.5)
MCV RBC AUTO: 96 FL (ref 78–100)
MONOCYTES # BLD AUTO: 0.3 10E9/L (ref 0–1.3)
MONOCYTES NFR BLD AUTO: 8.6 %
NEUTROPHILS # BLD AUTO: 2.1 10E9/L (ref 1.6–8.3)
NEUTROPHILS NFR BLD AUTO: 60.7 %
NRBC # BLD AUTO: 0 10*3/UL
NRBC BLD AUTO-RTO: 0 /100
PLATELET # BLD AUTO: 96 10E9/L (ref 150–450)
POTASSIUM SERPL-SCNC: 3.7 MMOL/L (ref 3.4–5.3)
PROT SERPL-MCNC: 6.4 G/DL (ref 6.8–8.8)
RBC # BLD AUTO: 3.65 10E12/L (ref 4.4–5.9)
RETICS # AUTO: 122.6 10E9/L (ref 25–95)
RETICS/RBC NFR AUTO: 3.4 % (ref 0.5–2)
SMA IGG SER-ACNC: 16
SODIUM SERPL-SCNC: 142 MMOL/L (ref 133–144)
WBC # BLD AUTO: 3.4 10E9/L (ref 4–11)

## 2017-04-13 PROCEDURE — 85045 AUTOMATED RETICULOCYTE COUNT: CPT | Performed by: INTERNAL MEDICINE

## 2017-04-13 PROCEDURE — 85060 BLOOD SMEAR INTERPRETATION: CPT | Performed by: INTERNAL MEDICINE

## 2017-04-13 PROCEDURE — 80053 COMPREHEN METABOLIC PANEL: CPT | Performed by: INTERNAL MEDICINE

## 2017-04-13 PROCEDURE — 25000132 ZZH RX MED GY IP 250 OP 250 PS 637: Performed by: INTERNAL MEDICINE

## 2017-04-13 PROCEDURE — 36415 COLL VENOUS BLD VENIPUNCTURE: CPT | Performed by: INTERNAL MEDICINE

## 2017-04-13 PROCEDURE — 99239 HOSP IP/OBS DSCHRG MGMT >30: CPT | Performed by: INTERNAL MEDICINE

## 2017-04-13 PROCEDURE — 85004 AUTOMATED DIFF WBC COUNT: CPT | Performed by: INTERNAL MEDICINE

## 2017-04-13 PROCEDURE — 82248 BILIRUBIN DIRECT: CPT | Performed by: INTERNAL MEDICINE

## 2017-04-13 PROCEDURE — 85027 COMPLETE CBC AUTOMATED: CPT | Performed by: INTERNAL MEDICINE

## 2017-04-13 PROCEDURE — 40000847 ZZHCL STATISTIC MORPHOLOGY W/INTERP HISTOLOGY TC 85060: Performed by: INTERNAL MEDICINE

## 2017-04-13 RX ORDER — PANTOPRAZOLE SODIUM 40 MG/1
40 TABLET, DELAYED RELEASE ORAL EVERY MORNING
Status: DISCONTINUED | OUTPATIENT
Start: 2017-04-13 | End: 2017-04-13 | Stop reason: HOSPADM

## 2017-04-13 RX ADMIN — PANTOPRAZOLE SODIUM 40 MG: 40 TABLET, DELAYED RELEASE ORAL at 07:48

## 2017-04-13 ASSESSMENT — PAIN DESCRIPTION - DESCRIPTORS: DESCRIPTORS: DULL;DISCOMFORT

## 2017-04-13 NOTE — PROGRESS NOTES
"[facility]  Hospitalist Progress Note  Patient Name: Phillip Crabtree    MRN: [id number]  Provider: Samuel Adamson MD  04/12/17    Initial presenting complaint/issue to hospital (Diagnosis): jaundice         Assessment and Plan:      Phillip Crabtree is a 61 year old male with PMH including GERD who was admitted 04/11/17 with 2 day history of epigastric pain with eating followed by development of significant jaundice and was found to have hyperbilirubinemia and transaminitis. CT was obtained and showed cholelithiasis and possible 4 mm distal common bile duct stone.  Patient was seen by GI.     1. Hyperbilirubinemia and Transaminitis: Gall stones and possible tiny distal common bile duct stone were noted on CT but there was no dilitation noted.   Discussed with Dr. Witt and he is not convinced that jaundice is due to stone disease. Non-obstructive causes of hyperbilirubinemia are being explored (hepatitis serologies, KIANNA, anti- smooth muscle Ab, iron studies, EBV, and CMV).    Repeat LFT's in am and reassess patient.      2.  Cholelithiasis.  Eventual surgery eval- possibly in clinic if pain does not recur.    3.  Possible tiny distal common bile duct stone.  Defer management to GI.     4. Thrombocytopenia: Platelets 94 then 93.  Repeat in AM. Check peripheral smear in am.     5. Epigastric Abdominal Pain:  This may have been due to gall stones.  No pain now.  Eventual surgery eval- possibly in clinic- to assess for possible lap ophelia.     4. GERD: Stable     DVT Prophylaxis: Pneumatic Compression Devices  Code Status: Full Code  Discharge Dispo: Home in 1-2 days depending on plan         Interval History:      Patient is no longer having abdominal pain.                   Physical Exam:      Last Vital Signs:  /71  Pulse 62  Temp 98  F (36.7  C) (Oral)  Resp 16  Ht 1.778 m (5' 10\")  Wt 106.8 kg (235 lb 8 oz)  SpO2 99%  BMI 33.79 kg/m2    Intake/Output Summary (Last 24 hours) at 04/12/17 " 2223  Last data filed at 04/12/17 1915   Gross per 24 hour   Intake             2132 ml   Output             3525 ml   Net            -1393 ml       GENERAL:  Comfortable. Cooperative.  PSYCH: pleasant, oriented, No acute distress.  EYES: PERRLA, icteric sclera   HEART:  Regular rate and rhythm. No JVD. Pulses normal. No edema.  LUNGS:  Clear to auscultation, normal Respiratory effort.  ABDOMEN:  Soft, no hepatosplenomegaly, normal bowel sounds.  EXTREMETIES: No clubbing, cyanosis or ischemia  SKIN:  Dry to touch, No rash. jaundice           Medications:      All current medications were reviewed.         Data:      All new lab and imaging data was reviewed.   Labs:       Lab Results   Component Value Date     04/12/2017     04/11/2017     02/09/2014    Lab Results   Component Value Date    CHLORIDE 108 04/12/2017    CHLORIDE 103 04/11/2017    CHLORIDE 103 02/09/2014    Lab Results   Component Value Date    BUN 10 04/12/2017    BUN 12 04/11/2017    BUN 15 02/09/2014      Lab Results   Component Value Date    POTASSIUM 3.5 04/12/2017    POTASSIUM 3.4 04/11/2017    POTASSIUM 3.8 02/09/2014    Lab Results   Component Value Date    CO2 24 04/12/2017    CO2 25 04/11/2017    CO2 21 02/09/2014    Lab Results   Component Value Date    CR 0.82 04/12/2017    CR 0.86 04/11/2017    CR 0.83 02/09/2014          Recent Labs  Lab 04/12/17  0550 04/11/17  2040   WBC 3.3* 4.5   HGB 11.5* 12.4*   HCT 32.7* 35.8*   MCV 95 95   PLT 93* 94*       Recent Labs  Lab 04/12/17  0550 04/11/17  2040   AST 85* 119*   * 235*   ALKPHOS 88 103   BILITOTAL 16.2* 20.4*      Imaging:   Recent Results (from the past 24 hour(s))   CT Abdomen Pelvis w Contrast    Narrative    CT ABDOMEN PELVIS W CONTRAST  4/11/2017 11:29 PM     HISTORY: New onset conjugated hyperbilirubinemia.    TECHNIQUE: Volumetric acquisition through abdomen and pelvis with I.V.  contrast. 100 mL Isovue-370. Radiation dose for this scan was reduced  using  automated exposure control, adjustment of the mA and/or kV  according to patient size, or iterative reconstruction technique.    COMPARISON: Ultrasound of the abdomen 4/11/2017.    FINDINGS: Negative liver. Gallbladder is mildly distended and filled  with stones. Spleen is mildly enlarged measuring 14.7 cm AP. Pancreas,  adrenal glands and kidneys demonstrate no worrisome findings. There is  the suggestion of a faint tiny stone in the distal common bile duct  measuring 0.4 cm without significant bile duct dilatation.    Enlarged prostate with nodular protrusion into the bladder base.  Pelvic structures otherwise negative. No bowel obstruction, ascites or  other acute findings.      Impression    IMPRESSION:  1. Cholelithiasis. Gallbladder is mildly distended.  2. Probable subtle tiny distal common bile duct stone measuring 0.4  cm.  3. Borderline splenomegaly.    LATRELL MOORE MD

## 2017-04-13 NOTE — DISCHARGE SUMMARY
[facility]    Discharge Summary  Hospitalist    Date of Admission:  4/11/2017  Date of Discharge:  4/13/2017  Discharging Provider: Luis Valerio MD  Date of Service (when I saw the patient): 04/13/17    Discharge Diagnoses       1. Hyperbilirubinemia and Transaminitis     2. Cholelithiasis     3. Possible tiny distal common bile duct stone      4. Thrombocytopenia      5. Epigastric abdominal pain: improved     4. GERD: Stable    History of Present Illness   Phillip Crabtree is an 61 year old male who presented with jaundice. Please see H&P for details.     Hospital Course   Phillip Crabtree is a 61 year old male with PMH including GERD who was admitted 04/11/17 with 2 day history of epigastric pain with eating followed by development of significant jaundice and was found to have hyperbilirubinemia and transaminitis. CT was obtained and showed cholelithiasis and possible 4 mm distal common bile duct stone. There is no evidence of cholecystitis. Patient was seen by GI.      1. Hyperbilirubinemia and Transaminitis: His bilirubin total on admission was  20.4. , AST Total bilirubin trending down to 9.2. Discussed with GI. Gallstones unlikely and suspecting non-obstructive causes of hyperbilirubinemia including intrinsic liver disease. Patient denies alcohol use. Monospot and Hepatitis A IGM negative. Diet advanced. Gastroenterology recommended outpatient follow up with liver clinic.      2. Cholelithiasis. Patient didn't have evidence of cholecystitisOutpatient follow up.      3. Possible tiny distal common bile duct stone .GI didn't think his current symptoms are related to CBD stone. Patient had improvement of abdominal pain. His bilirubin was down to 9.2. GI recommended outpatient follow up.       4. Thrombocytopenia: Will monitor. No evidence of bleeding.       5. Epigastric Abdominal Pain: This may have been due to gall stones. No pain now. Eventual surgery eval- possibly in clinic- to  assess for possible lap ophelia.      4. GERD: Stable  Patient remained stable during hospital course.He was discharged home in a stable condition.     Significant Results and Procedures   Results for orders placed or performed during the hospital encounter of 04/11/17   Abdomen US, complete    Narrative    ULTRASOUND ABDOMEN COMPLETE 4/11/2017 9:16 PM     HISTORY: Abdominal distention.    COMPARISON: None.    FINDINGS:  Liver is unremarkable in echogenicity without focal solid  lesions. Gallbladder demonstrates cholelithiasis without  cholecystitis. Extrahepatic bile duct is normal in diameter. Pancreas  is completely obscured. Spleen is unremarkable in echogenicity but  enlarged at 16.1 cm. Kidneys are normal in size. There is no  hydronephrosis. Visualized abdominal aorta and IVC are nonaneurysmal.  No ascites demonstrated.      Impression    IMPRESSION:    1. No ascites.  2. Enlarged spleen at 16.1 cm.    SHAYY DUFFY MD   CT Abdomen Pelvis w Contrast    Narrative    CT ABDOMEN PELVIS W CONTRAST  4/11/2017 11:29 PM     HISTORY: New onset conjugated hyperbilirubinemia.    TECHNIQUE: Volumetric acquisition through abdomen and pelvis with I.V.  contrast. 100 mL Isovue-370. Radiation dose for this scan was reduced  using automated exposure control, adjustment of the mA and/or kV  according to patient size, or iterative reconstruction technique.    COMPARISON: Ultrasound of the abdomen 4/11/2017.    FINDINGS: Negative liver. Gallbladder is mildly distended and filled  with stones. Spleen is mildly enlarged measuring 14.7 cm AP. Pancreas,  adrenal glands and kidneys demonstrate no worrisome findings. There is  the suggestion of a faint tiny stone in the distal common bile duct  measuring 0.4 cm without significant bile duct dilatation.    Enlarged prostate with nodular protrusion into the bladder base.  Pelvic structures otherwise negative. No bowel obstruction, ascites or  other acute findings.      Impression     IMPRESSION:  1. Cholelithiasis. Gallbladder is mildly distended.  2. Probable subtle tiny distal common bile duct stone measuring 0.4  cm.  3. Borderline splenomegaly.    LATRELL MOORE MD         Pending Results   KIANNA    Code Status   Full Code       Primary Care Physician   [facility]          Discharge Disposition   Discharged to home  Condition at discharge: Stable    Consultations This Hospital Stay   GASTROENTEROLOGY IP CONSULT    Time Spent on this Encounter   I, Luis Valerio MD, personally saw the patient today and spent greater than 30 minutes discharging this patient.    Discharge Orders     Reason for your hospital stay   jaundice     Follow-up and recommended labs and tests    Follow up with primary care provider, [facility], within 7 days   Follow up with liver clinic as scheduled     Activity   Your activity upon discharge: activity as tolerated     Diet   Follow this diet upon discharge: Orders Placed This Encounter     Low Fat Diet       Discharge Medications   Current Discharge Medication List      CONTINUE these medications which have NOT CHANGED    Details   diphenhydrAMINE (BENADRYL) 25 MG capsule Take 25 mg by mouth every 6 hours as needed for itching or allergies      OMEPRAZOLE PO Take 20 mg by mouth every morning      Cholecalciferol (VITAMIN D3 PO) Take 2,000 Units by mouth daily           Allergies   Allergies   Allergen Reactions     Ibuprofen      Data   Most Recent 3 CBC's:  Recent Labs   Lab Test  04/13/17   0633  04/12/17   0550  04/11/17   2040   WBC  3.4*  3.3*  4.5   HGB  11.9*  11.5*  12.4*   MCV  96  95  95   PLT  96*  93*  94*      Most Recent 3 BMP's:  Recent Labs   Lab Test  04/13/17   0633  04/12/17   0550  04/11/17   2040   NA  142  140  138   POTASSIUM  3.7  3.5  3.4   CHLORIDE  112*  108  103   CO2  25  24  25   BUN  5*  10  12   CR  0.80  0.82  0.86   ANIONGAP  5  8  10   LISA  8.2*  8.2*  8.5   GLC  106*  98  77      Most Recent 2 LFT's:  Recent Labs   Lab Test  04/13/17   0633  04/12/17   0550   AST  70*  85*   ALT  168*  187*   ALKPHOS  95  88   BILITOTAL  9.2*  16.2*     Most Recent INR's and Anticoagulation Dosing History:  Anticoagulation Dose History     Recent Dosing and Labs Latest Ref Rng & Units 4/11/2017    INR 0.86 - 1.14 1.15(H)        Most Recent 3 Troponin's:  Recent Labs   Lab Test  04/11/17   2040  02/09/14   1928  02/09/14   1545   02/09/14   1129   TROPI  <0.015  The 99th percentile for upper reference range is 0.045 ug/L.  Troponin values in   the range of 0.045 - 0.120 ug/L may be associated with risks of adverse   clinical events.    0.033  <0.012   < >   --    TROPONIN   --    --    --    --   0.00    < > = values in this interval not displayed.     Most Recent Cholesterol Panel:No lab results found.  Most Recent 6 Bacteria Isolates From Any Culture (See EPIC Reports for Culture Details):No lab results found.  Most Recent TSH, T4 and A1c Labs:No lab results found.

## 2017-04-13 NOTE — PROGRESS NOTES
"[facility]  Hospitalist Progress Note  Patient Name: Phillip Crabtree    MRN: [id number]  Provider: Luis Valerio MD    Initial presenting complaint/issue to hospital (Diagnosis): jaundice         Assessment and Plan:      Phillip Crabtree is a 61 year old male with PMH including GERD who was admitted 04/11/17 with 2 day history of epigastric pain with eating followed by development of significant jaundice and was found to have hyperbilirubinemia and transaminitis. CT was obtained and showed cholelithiasis and possible 4 mm distal common bile duct stone. Patient was seen by GI.     1. Hyperbilirubinemia and Transaminitis:  Total bilirubin trending down to 9.2 today.  Discussed with GI. Gallstones unlikely and suspecting non-obstructive causes of hyperbilirubinemia. R/o intrinsic liver disease. Denies alcohol use.  Monospot and Hepatitis A IGM negative.   --Diet advanced. Needs outpatient follow up with liver clinic.     2.  Cholelithiasis. Outpatient follow up.     3.  Possible tiny distal common bile duct stone.  Defer management to GI.GI doesn't think his current symptoms are related to CBD stone     4. Thrombocytopenia: Will monitor. No evidence of bleeding.      5. Epigastric Abdominal Pain:  This may have been due to gall stones.  No pain now.  Eventual surgery eval- possibly in clinic- to assess for possible lap ophelia.     4. GERD: Stable     DVT Prophylaxis: Pneumatic Compression Devices  Code Status: Full Code  Discharge Dispo: Home in 1-2 days depending on plan         Interval History:      Patient claims that he is feeling well. He claims that he is having epigastric discomfort. He has no vomiting or diarrhea.                   Physical Exam:      Last Vital Signs:  /76 (BP Location: Left arm)  Pulse 62  Temp 97.6  F (36.4  C) (Oral)  Resp 20  Ht 1.778 m (5' 10\")  Wt 106.8 kg (235 lb 8 oz)  SpO2 98%  BMI 33.79 kg/m2    Intake/Output Summary (Last 24 hours) at 04/12/17 2223  Last " data filed at 04/12/17 1915   Gross per 24 hour   Intake             2132 ml   Output             3525 ml   Net            -1393 ml       GENERAL:  Comfortable. Cooperative.  PSYCH: pleasant, oriented, No acute distress.  EYES: PERRLA, icteric sclera   HEART:  Regular rate and rhythm. No JVD. Pulses normal. No edema.  LUNGS:  Clear to auscultation, normal Respiratory effort.  ABDOMEN:  Soft, no hepatosplenomegaly, normal bowel sounds.  EXTREMETIES: No clubbing, cyanosis or ischemia  SKIN:  Dry to touch, No rash. jaundice           Medications:      All current medications were reviewed.         Data:      All new lab and imaging data was reviewed.   Labs:       Lab Results   Component Value Date     04/12/2017     04/11/2017     02/09/2014    Lab Results   Component Value Date    CHLORIDE 108 04/12/2017    CHLORIDE 103 04/11/2017    CHLORIDE 103 02/09/2014    Lab Results   Component Value Date    BUN 10 04/12/2017    BUN 12 04/11/2017    BUN 15 02/09/2014      Lab Results   Component Value Date    POTASSIUM 3.5 04/12/2017    POTASSIUM 3.4 04/11/2017    POTASSIUM 3.8 02/09/2014    Lab Results   Component Value Date    CO2 24 04/12/2017    CO2 25 04/11/2017    CO2 21 02/09/2014    Lab Results   Component Value Date    CR 0.82 04/12/2017    CR 0.86 04/11/2017    CR 0.83 02/09/2014          Recent Labs  Lab 04/13/17  0633 04/12/17  0550 04/11/17  2040   WBC 3.4* 3.3* 4.5   HGB 11.9* 11.5* 12.4*   HCT 34.9* 32.7* 35.8*   MCV 96 95 95   PLT 96* 93* 94*       Recent Labs  Lab 04/13/17  0633 04/12/17  0550 04/11/17  2040   AST 70* 85* 119*   * 187* 235*   ALKPHOS 95 88 103   BILITOTAL 9.2* 16.2* 20.4*      Imaging:   Recent Results (from the past 24 hour(s))   CT Abdomen Pelvis w Contrast    Narrative    CT ABDOMEN PELVIS W CONTRAST  4/11/2017 11:29 PM     HISTORY: New onset conjugated hyperbilirubinemia.    TECHNIQUE: Volumetric acquisition through abdomen and pelvis with I.V.  contrast. 100 mL  Isovue-370. Radiation dose for this scan was reduced  using automated exposure control, adjustment of the mA and/or kV  according to patient size, or iterative reconstruction technique.    COMPARISON: Ultrasound of the abdomen 4/11/2017.    FINDINGS: Negative liver. Gallbladder is mildly distended and filled  with stones. Spleen is mildly enlarged measuring 14.7 cm AP. Pancreas,  adrenal glands and kidneys demonstrate no worrisome findings. There is  the suggestion of a faint tiny stone in the distal common bile duct  measuring 0.4 cm without significant bile duct dilatation.    Enlarged prostate with nodular protrusion into the bladder base.  Pelvic structures otherwise negative. No bowel obstruction, ascites or  other acute findings.      Impression    IMPRESSION:  1. Cholelithiasis. Gallbladder is mildly distended.  2. Probable subtle tiny distal common bile duct stone measuring 0.4  cm.  3. Borderline splenomegaly.    LATRELL MOORE MD

## 2017-04-13 NOTE — PLAN OF CARE
"Problem: Goal Outcome Summary  Goal: Goal Outcome Summary  Outcome: No Change  [place]: A & O x 4.   VS:  VSS  LS: Clear bilaterally.   GI:  Pt advanced to low fat diet today.  C/o gas pain to upper mid abd.  Denies need for pharmacological interventions.  Denies nausea.   : Urine orange.  Pt reports color improving toward normal.   Skin: Slight jaundice noted to skin and sclarea.    Pain: Pain to abd. Describes as \"gas pain\"  Transfer: up independently in room.   IV:  PIV SL.    Diet:  Taking PO fluids well.  Not tolerating PO foods.    Plan: TBD.  Monitor pt tonight. Possible discharge home tomorrow.           "

## 2017-04-13 NOTE — PLAN OF CARE
Problem: Goal Outcome Summary  Goal: Goal Outcome Summary  Vitals stable, denies pain or nausea. Diet advanced to low fat for breakfast. Voiding fine, patient reports urine is less orange. Possible discharge today.

## 2017-04-13 NOTE — PLAN OF CARE
Problem: Goal Outcome Summary  Goal: Goal Outcome Summary  Outcome: No Change  Pt remains hospitalized for: jaundice    Ambulatory Status:  Pt up ind.  Orientation: a&o  VS:  VSS  Pain:  denies  Resp: LS dim. clear. Cough denies  GI:  denies nausea.  good appetite and on full liquid diet-advanced diet to low fat for breakfast.  BS hypo.  Passing flatus.  Last BM 4/9.  :  Voiding without difficulty-orange urine  Skin:  Intact-yellow   Labs:  Bilirubin 16.2  Disposition:  Possible d/c tomorrow

## 2017-04-13 NOTE — PROGRESS NOTES
"Pt c/o increased pain to upper abd.  States, \"similar to the pain I first had.\"  GI notified.  Will continue to monitor.  No new orders received.   "

## 2017-04-13 NOTE — PROGRESS NOTES
"GASTROENTEROLOGY PROGRESS NOTE       SUBJECTIVE:  Feeling better. Tolerated an omelette for breakfast without pain.      OBJECTIVE:    /76 (BP Location: Left arm)  Pulse 62  Temp 97.6  F (36.4  C) (Oral)  Resp 20  Ht 1.778 m (5' 10\")  Wt 106.8 kg (235 lb 8 oz)  SpO2 98%  BMI 33.79 kg/m2  Temp (24hrs), Av.7  F (36.5  C), Min:97.6  F (36.4  C), Max:98  F (36.7  C)    Patient Vitals for the past 72 hrs:   Weight   17 2337 106.8 kg (235 lb 8 oz)   17 1912 106.6 kg (235 lb)       Intake/Output Summary (Last 24 hours) at 17 0843  Last data filed at 17 0642   Gross per 24 hour   Intake             3909 ml   Output             2425 ml   Net             1484 ml        PHYSICAL EXAM    Constitutional: no acute distress  Cardiovascular: RRR, normal S1, S2, no murmur appreciated   Respiratory: good transmission, CTAB  Abdomen: soft, non-tender, +bowel sounds  NEURO: CN 2-12 grossly intact, no focal deficits  SKIN: +jaundice         Additional Comments:  ROS, FH, SH: See initial GI consult for details.     I have reviewed the patient's new clinical lab results:     Recent Labs   Lab Test  1750  17   WBC  3.4*  3.3*  4.5   HGB  11.9*  11.5*  12.4*   MCV  96  95  95   PLT  96*  93*  94*   INR   --    --   1.15*     Recent Labs   Lab Test  17   0550  17   POTASSIUM  3.7  3.5  3.4   CHLORIDE  112*  108  103   CO2  25  24  25   BUN  5*  10  12   ANIONGAP  5  8  10     Recent Labs   Lab Test  17   0550  17   1130   ALBUMIN  3.2*  3.1*  3.5   --   4.2   BILITOTAL  9.2*  16.2*  20.4*   --   1.4*   ALT  168*  187*  235*   --   35   AST  70*  85*  119*   --   20   PROTEIN   --    --    --   Negative   --    LIPASE   --    --   88   --   83   IMAGING / ENDOSCOPY   CT ABDOMEN PELVIS W CONTRAST 2017 11:29 PM       HISTORY: New onset conjugated " hyperbilirubinemia.      TECHNIQUE: Volumetric acquisition through abdomen and pelvis with I.V.  contrast. 100 mL Isovue-370. Radiation dose for this scan was reduced  using automated exposure control, adjustment of the mA and/or kV  according to patient size, or iterative reconstruction technique.      COMPARISON: Ultrasound of the abdomen 4/11/2017.      FINDINGS: Negative liver. Gallbladder is mildly distended and filled  with stones. Spleen is mildly enlarged measuring 14.7 cm AP. Pancreas,  adrenal glands and kidneys demonstrate no worrisome findings. There is  the suggestion of a faint tiny stone in the distal common bile duct  measuring 0.4 cm without significant bile duct dilatation.      Enlarged prostate with nodular protrusion into the bladder base.  Pelvic structures otherwise negative. No bowel obstruction, ascites or  other acute findings.          IMPRESSION:  1. Cholelithiasis. Gallbladder is mildly distended.  2. Probable subtle tiny distal common bile duct stone measuring 0.4  cm.  3. Borderline splenomegaly.        ULTRASOUND ABDOMEN COMPLETE 4/11/2017 9:16 PM       HISTORY: Abdominal distention.      COMPARISON: None.      FINDINGS: Liver is unremarkable in echogenicity without focal solid  lesions. Gallbladder demonstrates cholelithiasis without  cholecystitis. Extrahepatic bile duct is normal in diameter. Pancreas  is completely obscured. Spleen is unremarkable in echogenicity but  enlarged at 16.1 cm. Kidneys are normal in size. There is no  hydronephrosis. Visualized abdominal aorta and IVC are nonaneurysmal.  No ascites demonstrated.          IMPRESSION:   1. No ascites.  2. Enlarged spleen at 16.1 cm.     ASSESSMENT/ PLAN  Phillip Crabtree is a 61 year old admitted with jaundice and epigastric pain. Bilirubin up to 20 on admission, now 9.2. Alk phos 95, , AST 70. Imaging with no CBD dilation, ?stone on CT on CBD but no dilatation. Plt also low at 94, INR 1.15. Case was discussed with  Dr. Witt and Dr. David, biliary MD. Given labs and no CBD dilation, this is unlikely to be choledocholithiasis. There is concern for intrinsic liver disease and further labs ordered so far unremarkable but others pending. Denies any medication changes/supplements. Denies history of heavy drinking. ?history of hepatitis in past. Iron studies normal, Hep A IgM negative and monospot negative     -Awaiting remaining Hepatitis, KIANNA, smooth muscle ab and CMV  -If he continues to improve and tolerates lunch, okay for discharge and I will arrange follow up in liver clinic    Mone Simeon PA-C  [facility]

## 2017-05-10 ENCOUNTER — HOSPITAL ENCOUNTER (OUTPATIENT)
Dept: ULTRASOUND IMAGING | Facility: CLINIC | Age: 62
Discharge: HOME | End: 2017-05-10
Attending: PHYSICIAN ASSISTANT | Admitting: PHYSICIAN ASSISTANT
Payer: COMMERCIAL

## 2017-05-10 DIAGNOSIS — R94.5 ABNORMAL RESULTS OF LIVER FUNCTION STUDIES: ICD-10-CM

## 2017-05-10 PROCEDURE — 76705 ECHO EXAM OF ABDOMEN: CPT

## 2019-10-01 ENCOUNTER — HEALTH MAINTENANCE LETTER (OUTPATIENT)
Age: 64
End: 2019-10-01

## 2021-01-15 ENCOUNTER — HEALTH MAINTENANCE LETTER (OUTPATIENT)
Age: 66
End: 2021-01-15

## 2021-09-04 ENCOUNTER — HEALTH MAINTENANCE LETTER (OUTPATIENT)
Age: 66
End: 2021-09-04

## 2022-02-19 ENCOUNTER — HEALTH MAINTENANCE LETTER (OUTPATIENT)
Age: 67
End: 2022-02-19

## 2022-05-26 NOTE — CONSULTS
GASTROENTEROLOGY CONSULTATION      Phillip Crabtree  [address] [facility] [id number]  61 year old male     Admission Date/Time: 4/11/2017  Primary Care Provider: [place], [facility] [address]  Referring / Attending Physician:  Dr. Crowe     We were asked to see the patient in consultation by Dr. Crowe for evaluation of hyperbilirubinemia and abdominal pain.        HPI:  Phillip Crabtree is a 61 year old male with PMH of GERD who presents with 2 days of epigastric pain and jaundice. He reports that he has felt well until 2 days ago. Denies chronic symptoms, unintentional weight loss or previous similar episodes of symptoms. Symptoms started after going to a taco party where he ate a lot and had 2 beers. He developed epigastric pain described as sharp ache that resolved after a few hours. Pain returned when he ate breakfast the next morning. He denies associated nausea, vomiting but felt chilled 2 days ago.     He denies any new medications or supplements. He takes Prilosec 10 mg a day and vitamin D. He drinks about 1-2 days per week and will have about 2 beers when he drinks. No recent travel or sick contacts. He remembers being told he previously had hepatitis but unsure what kind. Denies family history of liver disease.       PAST MEDICAL HISTORY:  Patient Active Problem List    Diagnosis Date Noted     Jaundice 04/11/2017     Priority: Medium     Chest pain 02/09/2014          ROS: A comprehensive ten point review of systems was negative aside from those in mentioned in the HPI.       MEDICATIONS:   Prior to Admission medications    Medication Sig Start Date End Date Taking? Authorizing Provider   diphenhydrAMINE (BENADRYL) 25 MG capsule Take 25 mg by mouth every 6 hours as needed for itching or allergies   Yes Unknown, Entered By History   OMEPRAZOLE PO Take 20 mg by mouth every morning   Yes Unknown, Entered By History   Cholecalciferol (VITAMIN D3 PO) Take 2,000 Units by mouth daily   Yes Unknown,  "Entered By History        ALLERGIES:   Allergies   Allergen Reactions     Ibuprofen         SOCIAL HISTORY:  Social History   Substance Use Topics     Smoking status: Not on file     Smokeless tobacco: Not on file     Alcohol use 3.6 oz/week     3 Shots of liquor, 3 Cans of beer per week      Comment: Drinks beer/wine/liquor 3x/week   Denies tobacco use     FAMILY HISTORY:  No family history on file.   Sister  from metastatic colon CA diagnosed in her 40's. Patient gets colonoscopies every 3 years at [facility]. States he has personal history of colon polyps.     PHYSICAL EXAM:     /64  Pulse 62  Temp 98  F (36.7  C)  Resp 14  Ht 1.778 m (5' 10\")  Wt 106.8 kg (235 lb 8 oz)  SpO2 97%  BMI 33.79 kg/m2     PHYSICAL EXAM:  GENERAL: No acute distress  SKIN: no suspicious lesions, rashes, jaundice, or spider angiomas  HEAD: Normocephalic. Atraumatic.  NECK: Neck supple. No adenopathy.   EYES: +scleral icterus  ENT: ENT exam normal, no neck nodes or sinus tenderness  RESPIRATORY: Good transmission. CTA bilaterally.   CARDIOVASCULAR: RRR, normal S1, S2,  No murmur appreciated  GASTROINTESTINAL: +BS, soft, very minimal epigastric tenderness, non distended, no hepatosplenomegaly, no masses/guarding/rebound  JOINT/EXTREMITIES:  no gross deformities noted, normal muscle tone  NEURO: CN 2-12 grossly intact, no focal deficits  PSYCH: Normal affect              ADDITIONAL COMMENTS:   I reviewed the patient's new clinical lab test results.   Recent Labs   Lab Test  17   0550  17   1130   WBC  3.3*  4.5  6.6   HGB  11.5*  12.4*  13.6   MCV  95  95  91   PLT  93*  94*  119*   INR   --   1.15*   --      Recent Labs   Lab Test  17   0550  17   1130   POTASSIUM  3.5  3.4  3.8   CHLORIDE  108  103  103   CO2  24  25  21   BUN  10  12  15   ANIONGAP  8  10  12     Recent Labs   Lab Test  17   0550  17   1130 "   ALBUMIN  3.1*  3.5   --   4.2   BILITOTAL  16.2*  20.4*   --   1.4*   ALT  187*  235*   --   35   AST  85*  119*   --   20   PROTEIN   --    --   Negative   --    LIPASE   --   88   --   83        IMAGING / ENDOSCOPY   CT ABDOMEN PELVIS W CONTRAST 4/11/2017 11:29 PM      HISTORY: New onset conjugated hyperbilirubinemia.     TECHNIQUE: Volumetric acquisition through abdomen and pelvis with I.V.  contrast. 100 mL Isovue-370. Radiation dose for this scan was reduced  using automated exposure control, adjustment of the mA and/or kV  according to patient size, or iterative reconstruction technique.     COMPARISON: Ultrasound of the abdomen 4/11/2017.     FINDINGS: Negative liver. Gallbladder is mildly distended and filled  with stones. Spleen is mildly enlarged measuring 14.7 cm AP. Pancreas,  adrenal glands and kidneys demonstrate no worrisome findings. There is  the suggestion of a faint tiny stone in the distal common bile duct  measuring 0.4 cm without significant bile duct dilatation.     Enlarged prostate with nodular protrusion into the bladder base.  Pelvic structures otherwise negative. No bowel obstruction, ascites or  other acute findings.         IMPRESSION:  1. Cholelithiasis. Gallbladder is mildly distended.  2. Probable subtle tiny distal common bile duct stone measuring 0.4  cm.  3. Borderline splenomegaly.      ULTRASOUND ABDOMEN COMPLETE 4/11/2017 9:16 PM      HISTORY: Abdominal distention.     COMPARISON: None.     FINDINGS: Liver is unremarkable in echogenicity without focal solid  lesions. Gallbladder demonstrates cholelithiasis without  cholecystitis. Extrahepatic bile duct is normal in diameter. Pancreas  is completely obscured. Spleen is unremarkable in echogenicity but  enlarged at 16.1 cm. Kidneys are normal in size. There is no  hydronephrosis. Visualized abdominal aorta and IVC are nonaneurysmal.  No ascites demonstrated.         IMPRESSION:   1. No ascites.  2. Enlarged spleen at 16.1  cm.       CONSULTATION ASSESSMENT AND PLAN:    Phillip Crabtree is a 61 year old admitted with jaundice and epigastric pain. Bilirubin up to 20 on admission, now 16. Alk phos 88, , AST 85. Imaging with no CBD dilation, ?stone on CT on CBD but no dilatation. Plt also low at 94, INR 1.15.  Case was discussed with Dr. Witt and Dr. David, biliary MD. Given labs and no CBD dilation, this is unlikely to be choledocholithiasis. There is concern for intrinsic liver disease. Denies any medication changes/supplements. Denies history of heavy drinking. ?history of hepatitis in past.    -Hepatitis ordered, will add on KIANNA, smooth muscle ab, iron studies, EBV and CMV  -Continue to trend LFTs  -No plan on endoscopic evaluation at this time, okay to restart clear liquid diet      Thank you for asking us to participate in the care of this patient.    Mone Simeon PA-C  [facility]   Niacinamide Counseling: I recommended taking niacin or niacinamide, also know as vitamin B3, twice daily. Recent evidence suggests that taking vitamin B3 (500 mg twice daily) can reduce the risk of actinic keratoses and non-melanoma skin cancers. Side effects of vitamin B3 include flushing and headache.

## 2022-10-16 ENCOUNTER — HEALTH MAINTENANCE LETTER (OUTPATIENT)
Age: 67
End: 2022-10-16

## 2023-04-01 ENCOUNTER — HEALTH MAINTENANCE LETTER (OUTPATIENT)
Age: 68
End: 2023-04-01

## 2025-03-12 ENCOUNTER — HOSPITAL ENCOUNTER (EMERGENCY)
Facility: CLINIC | Age: 70
Discharge: HOME | End: 2025-03-12
Attending: STUDENT IN AN ORGANIZED HEALTH CARE EDUCATION/TRAINING PROGRAM | Admitting: STUDENT IN AN ORGANIZED HEALTH CARE EDUCATION/TRAINING PROGRAM
Payer: MEDICARE

## 2025-03-12 VITALS
TEMPERATURE: 97.4 F | RESPIRATION RATE: 18 BRPM | OXYGEN SATURATION: 99 % | SYSTOLIC BLOOD PRESSURE: 138 MMHG | HEART RATE: 74 BPM | DIASTOLIC BLOOD PRESSURE: 96 MMHG

## 2025-03-12 DIAGNOSIS — K21.00 GASTROESOPHAGEAL REFLUX DISEASE WITH ESOPHAGITIS WITHOUT HEMORRHAGE: ICD-10-CM

## 2025-03-12 DIAGNOSIS — R07.9 CHEST PAIN, UNSPECIFIED TYPE: Primary | ICD-10-CM

## 2025-03-12 DIAGNOSIS — E66.9 OBESITY WITHOUT SERIOUS COMORBIDITY, UNSPECIFIED CLASS, UNSPECIFIED OBESITY TYPE: ICD-10-CM

## 2025-03-12 PROBLEM — K76.0 FATTY LIVER: Status: ACTIVE | Noted: 2022-04-12

## 2025-03-12 PROBLEM — K22.70 BARRETT'S ESOPHAGUS WITHOUT DYSPLASIA: Status: ACTIVE | Noted: 2023-06-04

## 2025-03-12 PROBLEM — E80.6 HYPERBILIRUBINEMIA: Status: ACTIVE | Noted: 2017-08-02

## 2025-03-12 PROBLEM — N40.1 BENIGN PROSTATIC HYPERPLASIA WITH LOWER URINARY TRACT SYMPTOMS: Status: ACTIVE | Noted: 2019-10-10

## 2025-03-12 PROBLEM — E80.4 GILBERT'S SYNDROME: Status: ACTIVE | Noted: 2022-04-12

## 2025-03-12 PROBLEM — Z85.828 HISTORY OF BASAL CELL CARCINOMA: Status: ACTIVE | Noted: 2017-01-12

## 2025-03-12 LAB
ANION GAP SERPL CALCULATED.3IONS-SCNC: 12 MMOL/L (ref 7–15)
BASOPHILS # BLD AUTO: 0 10E3/UL (ref 0–0.2)
BASOPHILS NFR BLD AUTO: 0 %
BUN SERPL-MCNC: 15.7 MG/DL (ref 8–23)
CALCIUM SERPL-MCNC: 9.1 MG/DL (ref 8.8–10.4)
CHLORIDE SERPL-SCNC: 104 MMOL/L (ref 98–107)
CREAT SERPL-MCNC: 0.97 MG/DL (ref 0.67–1.17)
EGFRCR SERPLBLD CKD-EPI 2021: 85 ML/MIN/1.73M2
EOSINOPHIL # BLD AUTO: 0.1 10E3/UL (ref 0–0.7)
EOSINOPHIL NFR BLD AUTO: 1 %
ERYTHROCYTE [DISTWIDTH] IN BLOOD BY AUTOMATED COUNT: 13 % (ref 10–15)
GLUCOSE SERPL-MCNC: 107 MG/DL (ref 70–99)
HCO3 SERPL-SCNC: 23 MMOL/L (ref 22–29)
HCT VFR BLD AUTO: 36.9 % (ref 40–53)
HGB BLD-MCNC: 12.8 G/DL (ref 13.3–17.7)
HOLD SPECIMEN: NORMAL
IMM GRANULOCYTES # BLD: 0 10E3/UL
IMM GRANULOCYTES NFR BLD: 0 %
LYMPHOCYTES # BLD AUTO: 1.1 10E3/UL (ref 0.8–5.3)
LYMPHOCYTES NFR BLD AUTO: 22 %
MCH RBC QN AUTO: 31.4 PG (ref 26.5–33)
MCHC RBC AUTO-ENTMCNC: 34.7 G/DL (ref 31.5–36.5)
MCV RBC AUTO: 90 FL (ref 78–100)
MONOCYTES # BLD AUTO: 0.4 10E3/UL (ref 0–1.3)
MONOCYTES NFR BLD AUTO: 8 %
NEUTROPHILS # BLD AUTO: 3.3 10E3/UL (ref 1.6–8.3)
NEUTROPHILS NFR BLD AUTO: 69 %
NRBC # BLD AUTO: 0 10E3/UL
NRBC BLD AUTO-RTO: 0 /100
PLATELET # BLD AUTO: 102 10E3/UL (ref 150–450)
POTASSIUM SERPL-SCNC: 4 MMOL/L (ref 3.4–5.3)
RBC # BLD AUTO: 4.08 10E6/UL (ref 4.4–5.9)
SODIUM SERPL-SCNC: 139 MMOL/L (ref 135–145)
TROPONIN T SERPL HS-MCNC: <6 NG/L
WBC # BLD AUTO: 4.8 10E3/UL (ref 4–11)

## 2025-03-12 PROCEDURE — 84484 ASSAY OF TROPONIN QUANT: CPT | Performed by: STUDENT IN AN ORGANIZED HEALTH CARE EDUCATION/TRAINING PROGRAM

## 2025-03-12 PROCEDURE — 93005 ELECTROCARDIOGRAM TRACING: CPT | Performed by: STUDENT IN AN ORGANIZED HEALTH CARE EDUCATION/TRAINING PROGRAM

## 2025-03-12 PROCEDURE — 80051 ELECTROLYTE PANEL: CPT | Performed by: STUDENT IN AN ORGANIZED HEALTH CARE EDUCATION/TRAINING PROGRAM

## 2025-03-12 PROCEDURE — 85025 COMPLETE CBC W/AUTO DIFF WBC: CPT | Performed by: STUDENT IN AN ORGANIZED HEALTH CARE EDUCATION/TRAINING PROGRAM

## 2025-03-12 PROCEDURE — 99285 EMERGENCY DEPT VISIT HI MDM: CPT | Mod: 25 | Performed by: STUDENT IN AN ORGANIZED HEALTH CARE EDUCATION/TRAINING PROGRAM

## 2025-03-12 PROCEDURE — 36415 COLL VENOUS BLD VENIPUNCTURE: CPT | Performed by: EMERGENCY MEDICINE

## 2025-03-12 PROCEDURE — 80048 BASIC METABOLIC PNL TOTAL CA: CPT | Performed by: STUDENT IN AN ORGANIZED HEALTH CARE EDUCATION/TRAINING PROGRAM

## 2025-03-12 ASSESSMENT — ACTIVITIES OF DAILY LIVING (ADL)
ADLS_ACUITY_SCORE: 41

## 2025-03-12 ASSESSMENT — COLUMBIA-SUICIDE SEVERITY RATING SCALE - C-SSRS
6. HAVE YOU EVER DONE ANYTHING, STARTED TO DO ANYTHING, OR PREPARED TO DO ANYTHING TO END YOUR LIFE?: NO
1. IN THE PAST MONTH, HAVE YOU WISHED YOU WERE DEAD OR WISHED YOU COULD GO TO SLEEP AND NOT WAKE UP?: NO
2. HAVE YOU ACTUALLY HAD ANY THOUGHTS OF KILLING YOURSELF IN THE PAST MONTH?: NO

## 2025-03-12 NOTE — ED PROVIDER NOTES
"  Emergency Department Note      History of Present Illness     Chief Complaint   Chest discomfort     HPI   Phillip Crabtree is a very pleasant 69 year old male with a history of GERD presenting with chest discomfort and back pain. He reports an occasional, dull left-sided chest and sharper back pain since mid-January. Today, the pain has been constant and stronger, prompting the ED visit. It is not worsened on inspiration, movement, or position, but states that lying down at night may trigger it. He has not taken any medications for these symptoms and denies any other physical symptoms or recent travel. Denies personal history of heart problems, kidney problems, or smoking. He notes a family history of heart attacks.     Independent Historian   None    Review of External Notes   I personally reviewed notes from the patient's  family medicine office visit  dated  11/9/23 . This provided me with information regarding patient's recent clinical course.     Past Medical History     Medical History and Problem List   GERD  Jaundice  Gilbert's syndrome  Hyperbilirubinemia  Basal cell carcinoma  Adenomatous polyp of colon  Urticaria  Esophageal reflux  Benign prostatic hyperplasia  LEW    Medications   Omeprazole  Prilosec    Surgical History   Liver biopsy  Vasectomy  [place] teeth  Cholecystectomy  ERCP 2x     Physical Exam     Patient Vitals for the past 24 hrs:   BP Temp Temp src Pulse Resp SpO2   03/12/25 1923 (!) 138/96 -- -- 74 18 99 %   03/12/25 1558 (!) 144/80 -- -- -- -- --   03/12/25 1556 -- 97.4  F (36.3  C) Temporal 61 16 98 %     Physical Exam  {List of [place] Exams:385925::\" \"}    Diagnostics     Lab Results   Labs Ordered and Resulted from Time of ED Arrival to Time of ED Departure   BASIC METABOLIC PANEL - Abnormal       Result Value    Sodium 139      Potassium 4.0      Chloride 104      Carbon Dioxide (CO2) 23      Anion Gap 12      Urea Nitrogen 15.7      Creatinine 0.97      GFR Estimate 85      " Calcium 9.1      Glucose 107 (*)    CBC WITH PLATELETS AND DIFFERENTIAL - Abnormal    WBC Count 4.8      RBC Count 4.08 (*)     Hemoglobin 12.8 (*)     Hematocrit 36.9 (*)     MCV 90      MCH 31.4      MCHC 34.7      RDW 13.0      Platelet Count 102 (*)     % Neutrophils 69      % Lymphocytes 22      % Monocytes 8      % Eosinophils 1      % Basophils 0      % Immature Granulocytes 0      NRBCs per 100 WBC 0      Absolute Neutrophils 3.3      Absolute Lymphocytes 1.1      Absolute Monocytes 0.4      Absolute Eosinophils 0.1      Absolute Basophils 0.0      Absolute Immature Granulocytes 0.0      Absolute NRBCs 0.0     TROPONIN T, HIGH SENSITIVITY - Normal    Troponin T, High Sensitivity <6         Imaging   Chest XR,  PA & LAT   Final Result   IMPRESSION: Negative chest.          EKG   ECG results from 03/12/25   EKG 12-lead, tracing only     Value    Systolic Blood Pressure     Diastolic Blood Pressure     Ventricular Rate 72    Atrial Rate 72    CO Interval 172    QRS Duration 86        QTc 413    P Axis 53    R AXIS 49    T Axis 49    Interpretation ECG      Sinus rhythm  Normal ECG  When compared with ECG of 09-Feb-2014 11:14,  Minimal criteria for Inferior infarct are no longer Present  Nonspecific T wave abnormality no longer evident in Inferior leads         Independent Interpretation   Chest x-ray reassuring against infiltrate    ED Course      Medications Administered   Medications - No data to display    Procedures   Procedures   None performed    Discussion of Management   None    ED Course   ED Course as of 03/12/25 1925   Wed Mar 12, 2025   1634 I obtained history and examined the patient as noted above.     1802 Chest XR,  PA & LAT  Independent interpretation: chest x-ray reassuring against infiltrate     1917 I rechecked the patient.        Additional Documentation  None    Medical Decision Making / Diagnosis     CMS Diagnoses: None    MIPS       None    MDM   {MDM simple  :526971}    {Decision  "Rule Calculators:841259}  {SDCCHECKLIST:109742}    {Critical care:519860::\" \"}    Disposition   The patient was discharged.     Diagnosis     ICD-10-CM    1. Chest pain, unspecified type  R07.9       2. Obesity without serious comorbidity, unspecified class, unspecified obesity type  E66.9       3. Gastroesophageal reflux disease with esophagitis without hemorrhage  K21.00            Discharge Medications   Discharge Medication List as of 3/12/2025  7:24 PM        "

## 2025-03-12 NOTE — ED TRIAGE NOTES
Pt states that for the past few months he has had intermittent chest pain and lightheadedness. Pain slightly worse today than it has been which prompted pt to come in.  Denies sob; denies exacerbating sx. Pt states that he was worked up for similar sx about a year and a half ago and was discharged. Alert and ambulatory.

## 2025-03-13 LAB
ATRIAL RATE - MUSE: 72 BPM
DIASTOLIC BLOOD PRESSURE - MUSE: NORMAL MMHG
INTERPRETATION ECG - MUSE: NORMAL
P AXIS - MUSE: 53 DEGREES
PR INTERVAL - MUSE: 172 MS
QRS DURATION - MUSE: 86 MS
QT - MUSE: 378 MS
QTC - MUSE: 413 MS
R AXIS - MUSE: 49 DEGREES
SYSTOLIC BLOOD PRESSURE - MUSE: NORMAL MMHG
T AXIS - MUSE: 49 DEGREES
VENTRICULAR RATE- MUSE: 72 BPM

## 2025-03-13 NOTE — DISCHARGE INSTRUCTIONS
Thank you for allowing us to evaluate you today.  Follow up with primary care clinician  in 1 week for scheduled follow up..  Please read the guidance provided with your discharge instructions.  Immediately return to the emergency department with any concerns.

## 2025-03-22 ENCOUNTER — HEALTH MAINTENANCE LETTER (OUTPATIENT)
Age: 70
End: 2025-03-22

## 2025-03-24 ENCOUNTER — HOSPITAL ENCOUNTER (EMERGENCY)
Facility: CLINIC | Age: 70
Discharge: HOME | End: 2025-03-24
Attending: EMERGENCY MEDICINE | Admitting: EMERGENCY MEDICINE
Payer: MEDICARE

## 2025-03-24 VITALS
HEIGHT: 70 IN | BODY MASS INDEX: 33.39 KG/M2 | RESPIRATION RATE: 18 BRPM | DIASTOLIC BLOOD PRESSURE: 82 MMHG | HEART RATE: 61 BPM | OXYGEN SATURATION: 100 % | WEIGHT: 233.25 LBS | SYSTOLIC BLOOD PRESSURE: 134 MMHG | TEMPERATURE: 97.7 F

## 2025-03-24 DIAGNOSIS — R07.9 CHEST PAIN, UNSPECIFIED TYPE: ICD-10-CM

## 2025-03-24 LAB
ANION GAP SERPL CALCULATED.3IONS-SCNC: 9 MMOL/L (ref 7–15)
ATRIAL RATE - MUSE: 68 BPM
BASOPHILS # BLD AUTO: 0 10E3/UL (ref 0–0.2)
BASOPHILS NFR BLD AUTO: 0 %
BUN SERPL-MCNC: 15.1 MG/DL (ref 8–23)
CALCIUM SERPL-MCNC: 9.3 MG/DL (ref 8.8–10.4)
CHLORIDE SERPL-SCNC: 105 MMOL/L (ref 98–107)
CREAT SERPL-MCNC: 0.97 MG/DL (ref 0.67–1.17)
D DIMER PPP FEU-MCNC: <0.27 UG/ML FEU (ref 0–0.5)
DIASTOLIC BLOOD PRESSURE - MUSE: NORMAL MMHG
EGFRCR SERPLBLD CKD-EPI 2021: 85 ML/MIN/1.73M2
EOSINOPHIL # BLD AUTO: 0.1 10E3/UL (ref 0–0.7)
EOSINOPHIL NFR BLD AUTO: 1 %
ERYTHROCYTE [DISTWIDTH] IN BLOOD BY AUTOMATED COUNT: 13.2 % (ref 10–15)
GLUCOSE SERPL-MCNC: 88 MG/DL (ref 70–99)
HCO3 SERPL-SCNC: 27 MMOL/L (ref 22–29)
HCT VFR BLD AUTO: 36.5 % (ref 40–53)
HGB BLD-MCNC: 12.6 G/DL (ref 13.3–17.7)
HOLD SPECIMEN: NORMAL
HOLD SPECIMEN: NORMAL
IMM GRANULOCYTES # BLD: 0 10E3/UL
IMM GRANULOCYTES NFR BLD: 1 %
INTERPRETATION ECG - MUSE: NORMAL
LYMPHOCYTES # BLD AUTO: 1 10E3/UL (ref 0.8–5.3)
LYMPHOCYTES NFR BLD AUTO: 21 %
MCH RBC QN AUTO: 31.8 PG (ref 26.5–33)
MCHC RBC AUTO-ENTMCNC: 34.5 G/DL (ref 31.5–36.5)
MCV RBC AUTO: 92 FL (ref 78–100)
MONOCYTES # BLD AUTO: 0.4 10E3/UL (ref 0–1.3)
MONOCYTES NFR BLD AUTO: 8 %
NEUTROPHILS # BLD AUTO: 3.1 10E3/UL (ref 1.6–8.3)
NEUTROPHILS NFR BLD AUTO: 68 %
NRBC # BLD AUTO: 0 10E3/UL
NRBC BLD AUTO-RTO: 0 /100
P AXIS - MUSE: 50 DEGREES
PLATELET # BLD AUTO: 96 10E3/UL (ref 150–450)
POTASSIUM SERPL-SCNC: 4.5 MMOL/L (ref 3.4–5.3)
PR INTERVAL - MUSE: 184 MS
QRS DURATION - MUSE: 94 MS
QT - MUSE: 404 MS
QTC - MUSE: 429 MS
R AXIS - MUSE: 43 DEGREES
RBC # BLD AUTO: 3.96 10E6/UL (ref 4.4–5.9)
SODIUM SERPL-SCNC: 141 MMOL/L (ref 135–145)
SYSTOLIC BLOOD PRESSURE - MUSE: NORMAL MMHG
T AXIS - MUSE: 35 DEGREES
TROPONIN T SERPL HS-MCNC: <6 NG/L
TROPONIN T SERPL HS-MCNC: <6 NG/L
VENTRICULAR RATE- MUSE: 68 BPM
WBC # BLD AUTO: 4.6 10E3/UL (ref 4–11)

## 2025-03-24 PROCEDURE — 85025 COMPLETE CBC W/AUTO DIFF WBC: CPT | Performed by: EMERGENCY MEDICINE

## 2025-03-24 PROCEDURE — 93005 ELECTROCARDIOGRAM TRACING: CPT

## 2025-03-24 PROCEDURE — 99284 EMERGENCY DEPT VISIT MOD MDM: CPT

## 2025-03-24 PROCEDURE — 36415 COLL VENOUS BLD VENIPUNCTURE: CPT | Performed by: EMERGENCY MEDICINE

## 2025-03-24 PROCEDURE — 84484 ASSAY OF TROPONIN QUANT: CPT | Performed by: EMERGENCY MEDICINE

## 2025-03-24 PROCEDURE — 85379 FIBRIN DEGRADATION QUANT: CPT | Performed by: EMERGENCY MEDICINE

## 2025-03-24 PROCEDURE — 80048 BASIC METABOLIC PNL TOTAL CA: CPT | Performed by: EMERGENCY MEDICINE

## 2025-03-24 ASSESSMENT — ACTIVITIES OF DAILY LIVING (ADL)
ADLS_ACUITY_SCORE: 41

## 2025-03-24 ASSESSMENT — COLUMBIA-SUICIDE SEVERITY RATING SCALE - C-SSRS
1. IN THE PAST MONTH, HAVE YOU WISHED YOU WERE DEAD OR WISHED YOU COULD GO TO SLEEP AND NOT WAKE UP?: NO
2. HAVE YOU ACTUALLY HAD ANY THOUGHTS OF KILLING YOURSELF IN THE PAST MONTH?: NO
6. HAVE YOU EVER DONE ANYTHING, STARTED TO DO ANYTHING, OR PREPARED TO DO ANYTHING TO END YOUR LIFE?: NO

## 2025-03-24 NOTE — ED TRIAGE NOTES
Pt arrives with complaint of left sided chest pressure. Last night while at rest patient had sharp pain in left upper chest radiating to his back lasting for 10 minutes and subsiding on its own, now has sensation of pressure here and endorses feeling some palpitations intermittently. A&OX4.

## 2025-03-24 NOTE — ED PROVIDER NOTES
"  Emergency Department Note      History of Present Illness     Chief Complaint   Chest Pain    HPI   Phillip Crabtree is a 69 year old male with a history of basal cell carcinoma and GERD presenting with chest pain. The patient states that yesterday he had an episode of left sided chest pain that lasted for 10 minutes. Today while in the ED, the patient had similar pain on the right side of his chest. The patient was in the ED on 25 for similar chest pain and has an appointment with his PCP on Thursday (25). The pain sometimes radiates to his back.  The patient states that yesterday's episode was the most intense that it has ever been. Phillip has never received a stress test. The patient's father  of a myocardial infarction at age 68 and his brother  of a myocardial infarction at age 60.     Independent Historian   None    Review of External Notes   None    Past Medical History     Medical History and Problem List   GERD  Garay's esophagus without dysplasia   Polyposis of colon   Fatty liver  Gilbert's syndrome   Benign prostatic hyperplasia   Hyperbilirubinemia   Basal cell carcinoma   LEW  Idiopathic urticaria   Jaundice     Medications   Omeprazole    Famotidine   Cyclobenzaprine   Methylprednisolone   Doxazosin     Surgical History   IR ERCP  Endoscopy   Liver biopsy   Vasectomy   [place] teeth extraction   Cholecystectomy     Physical Exam     Patient Vitals for the past 24 hrs:   BP Temp Temp src Pulse Resp SpO2 Height Weight   25 2100 134/82 -- -- 61 -- 100 % -- --   25 (!) 140/78 -- -- 62 -- 100 % -- --   25 1945 138/76 -- -- 62 -- 100 % -- --   25 1930 136/79 -- -- 62 -- 100 % -- --   25 191 (!) 148/81 -- -- 65 -- 100 % -- --   25 1900 (!) 145/84 -- -- 61 -- 100 % -- --   25 1845 139/76 -- -- 64 -- 93 % -- --   25 1332 (!) 150/77 97.7  F (36.5  C) Temporal 69 18 100 % -- --   25 1329 -- -- -- -- -- -- 1.778 m (5' 10\") 105.8 kg " (233 lb 4 oz)     Physical Exam  Constitutional: Alert, attentive, GCS 15  HENT:    Nose: Nose normal.   Eyes: EOM are normal, anicteric, conjugate gaze  CV: regular rate and rhythm   Chest: Effort normal and breath sounds clear without wheezing or rales, symmetric bilaterally   GI:  non tender. No distension. No guarding or rebound.    MSK: No LE edema, no tenderness to palpation of BLE.  Neurological: Alert, attentive, moving all extremities equally.   Skin: Skin is warm and dry.   Diagnostics     Lab Results   Labs Ordered and Resulted from Time of ED Arrival to Time of ED Departure   CBC WITH PLATELETS AND DIFFERENTIAL - Abnormal       Result Value    WBC Count 4.6      RBC Count 3.96 (*)     Hemoglobin 12.6 (*)     Hematocrit 36.5 (*)     MCV 92      MCH 31.8      MCHC 34.5      RDW 13.2      Platelet Count 96 (*)     % Neutrophils 68      % Lymphocytes 21      % Monocytes 8      % Eosinophils 1      % Basophils 0      % Immature Granulocytes 1      NRBCs per 100 WBC 0      Absolute Neutrophils 3.1      Absolute Lymphocytes 1.0      Absolute Monocytes 0.4      Absolute Eosinophils 0.1      Absolute Basophils 0.0      Absolute Immature Granulocytes 0.0      Absolute NRBCs 0.0     BASIC METABOLIC PANEL - Normal    Sodium 141      Potassium 4.5      Chloride 105      Carbon Dioxide (CO2) 27      Anion Gap 9      Urea Nitrogen 15.1      Creatinine 0.97      GFR Estimate 85      Calcium 9.3      Glucose 88     TROPONIN T, HIGH SENSITIVITY - Normal    Troponin T, High Sensitivity <6     D DIMER QUANTITATIVE - Normal    D-Dimer Quantitative <0.27     TROPONIN T, HIGH SENSITIVITY - Normal    Troponin T, High Sensitivity <6         Imaging   No orders to display       EKG     ECG results from 03/24/25   EKG 12-lead, tracing only     Value    Systolic Blood Pressure     Diastolic Blood Pressure     Ventricular Rate 68    Atrial Rate 68    MT Interval 184    QRS Duration 94        QTc 429    P Axis 50    R AXIS 43     T Axis 35    Interpretation ECG      Sinus rhythm with occasional Premature ventricular complexes  Nonspecific ST abnormality  Abnormal ECG  When compared with ECG of 12-Mar-2025 15:47,  Premature ventricular complexes are now Present  Unconfirmed report - interpretation of this ECG is computer generated - see medical record for final interpretation  Confirmed by - EMERGENCY ROOM, PHYSICIAN ([id number]),  Carlos Rico ([id number]) on 3/24/2025 2:43:03 PM         Independent Interpretation   None    ED Course      Medications Administered   Medications - No data to display    Procedures   Procedures     Discussion of Management   None    ED Course   ED Course as of 03/24/25 2206   Mon Mar 24, 2025   1857 I obtained the history and examined the patient as noted above.      2042 I disscussed dicharge instructions, patient is comfortable with discharge.        Additional Documentation  None    Medical Decision Making / Diagnosis     CMS Diagnoses: None    MIPS       None    [facility]   Phillip Crabtree is a 69 year old male past medical history significant for GERD, Estrella Bears syndrome presenting for brief episodes of migratory chest pain.  He was seen in the emergency department 2 weeks ago for the same, is due to follow-up with his PCP in coming days but return here due to recurrence of his pain.  These are noncardiac sounding in nature given the moving pattern, EKG is again unremarkable,  presents to troponins are negative x 2 with low suspicion for ACS.  He would be low risk by heart score, I do feel he is safe for continued outpatient evaluation though will likely need a stress test for additionalrestratification.  D-dimer is negative, low suspicion for PE.  He had a clear chest x-ray 2 weeks ago, I do not see utility in repeating this, he denies any pulmonary complaints.  I recommended PCP follow-up as planned in 3 days.    Disposition   The patient was discharged.     Diagnosis     ICD-10-CM    1. Chest pain, unspecified  type  R07.9          Heri Roman MD  [employer]  10:06 PM 03/24/25         Scribe Disclosure:  I, Matilde Conwayruddy, am serving as a scribe at 6:55 PM on 3/24/2025 to document services personally performed by Heri Roman MD based on my observations and the provider's statements to me.        Heri Roman MD  03/24/25 2189

## 2025-03-25 NOTE — DISCHARGE INSTRUCTIONS
You should follow-up with your regular doctor for recheck, they may consider outpatient provocative testing known as a stress test) certainly if you develop worsening pain, especially with exerting yourself, nauseousness, sweating or you pass out, you should return to the emergency department.    Discharge Instructions  Chest Pain    You have been seen today for chest pain or discomfort.  At this time, your provider has found no signs that your chest pain is due to a serious or life-threatening condition, (or you have declined more testing and/or admission to the hospital). However, sometimes there is a serious problem that does not show up right away. Your evaluation today may not be complete and you may need further testing and evaluation.     Generally, every Emergency Department visit should have a follow-up clinic visit with either a primary or a specialty clinic/provider. Please follow-up as instructed by your emergency provider today.  Return to the Emergency Department if:  Your chest pain changes, gets worse, starts to happen more often, or comes with less activity.  You are newly short of breath.  You get very weak or tired.  You pass out or faint.  You have any new symptoms, like fever, cough, numb legs, or you cough up blood.  You have anything else that worries you.    Until you follow-up with your regular provider, please do the following:  Take one aspirin daily unless you have an allergy or are told not to by your provider.  If a stress test appointment has been made, go to the appointment.  If you have questions, contact your regular provider.  Follow-up with your regular provider/clinic as directed; this is very important.    If you were given a prescription for medicine here today, be sure to read all of the information (including the package insert) that comes with your prescription.  This will include important information about the medicine, its side effects, and any warnings that you need to  know about.  The pharmacist who fills the prescription can provide more information and answer questions you may have about the medicine.  If you have questions or concerns that the pharmacist cannot address, please call or return to the Emergency Department.       Remember that you can always come back to the Emergency Department if you are not able to see your regular provider in the amount of time listed above, if you get any new symptoms, or if there is anything that worries you.

## 2025-05-03 ENCOUNTER — HEALTH MAINTENANCE LETTER (OUTPATIENT)
Age: 70
End: 2025-05-03